# Patient Record
Sex: MALE | Race: WHITE | NOT HISPANIC OR LATINO | Employment: FULL TIME | URBAN - METROPOLITAN AREA
[De-identification: names, ages, dates, MRNs, and addresses within clinical notes are randomized per-mention and may not be internally consistent; named-entity substitution may affect disease eponyms.]

---

## 2023-05-23 LAB
6-ACETYLMORPHINE: <25 NG/ML
7-AMINOCLONAZEPAM: <25 NG/ML
ALPHA-HYDROXYALPRAZOLAM: <25 NG/ML
ALPHA-HYDROXYMIDAZOLAM: <25 NG/ML
ALPRAZOLAM: <25 NG/ML
AMPHETAMINE (PRESENCE) IN URINE BY SCREEN METHOD: ABNORMAL
BARBITURATES PRESENCE IN URINE BY SCREEN METHOD: ABNORMAL
CANNABINOIDS IN URINE BY SCREEN METHOD: ABNORMAL
CHLORDIAZEPOXIDE: <25 NG/ML
CLONAZEPAM: <25 NG/ML
COCAINE (PRESENCE) IN URINE BY SCREEN METHOD: ABNORMAL
CODEINE: <50 NG/ML
CREATINE, URINE FOR DRUG: 122.6 MG/DL
DIAZEPAM: <25 NG/ML
DRUG SCREEN COMMENT URINE: ABNORMAL
EDDP: <25 NG/ML
FENTANYL CONFIRMATION, URINE: <2.5 NG/ML
HYDROCODONE: <25 NG/ML
HYDROMORPHONE: <25 NG/ML
LORAZEPAM: <25 NG/ML
METHADONE CONFIRMATION,URINE: <25 NG/ML
MIDAZOLAM: <25 NG/ML
MORPHINE URINE: <50 NG/ML
NORDIAZEPAM: <25 NG/ML
NORFENTANYL: <2.5 NG/ML
NORHYDROCODONE: <25 NG/ML
NOROXYCODONE: 649 NG/ML
O-DESMETHYLTRAMADOL: <50 NG/ML
OXAZEPAM: <25 NG/ML
OXYCODONE: <25 NG/ML
OXYMORPHONE: 81 NG/ML
PHENCYCLIDINE (PRESENCE) IN URINE BY SCREEN METHOD: ABNORMAL
TEMAZEPAM: <25 NG/ML
TRAMADOL: <50 NG/ML
ZOLPIDEM METABOLITE (ZCA): <25 NG/ML
ZOLPIDEM: <25 NG/ML

## 2023-10-17 ENCOUNTER — TELEPHONE (OUTPATIENT)
Dept: RHEUMATOLOGY | Facility: CLINIC | Age: 63
End: 2023-10-17
Payer: COMMERCIAL

## 2023-10-17 DIAGNOSIS — Z79.899 MEDICATION MANAGEMENT: ICD-10-CM

## 2023-10-17 DIAGNOSIS — M15.9 GENERALIZED OSTEOARTHRITIS: Primary | ICD-10-CM

## 2023-10-17 PROBLEM — M54.32 BILATERAL SCIATICA: Status: ACTIVE | Noted: 2023-10-17

## 2023-10-17 PROBLEM — M17.0 PRIMARY OSTEOARTHRITIS OF BOTH KNEES: Status: ACTIVE | Noted: 2023-10-17

## 2023-10-17 PROBLEM — M19.012 LOCALIZED OSTEOARTHRITIS OF BOTH SHOULDER REGIONS: Status: ACTIVE | Noted: 2023-10-17

## 2023-10-17 PROBLEM — M54.31 BILATERAL SCIATICA: Status: ACTIVE | Noted: 2023-10-17

## 2023-10-17 PROBLEM — M70.62 GREATER TROCHANTERIC BURSITIS OF BOTH HIPS: Status: ACTIVE | Noted: 2023-10-17

## 2023-10-17 PROBLEM — M70.61 GREATER TROCHANTERIC BURSITIS OF BOTH HIPS: Status: ACTIVE | Noted: 2023-10-17

## 2023-10-17 PROBLEM — M19.011 LOCALIZED OSTEOARTHRITIS OF BOTH SHOULDER REGIONS: Status: ACTIVE | Noted: 2023-10-17

## 2023-10-17 PROBLEM — E78.2 MIXED HYPERLIPIDEMIA: Status: ACTIVE | Noted: 2023-10-17

## 2023-10-17 RX ORDER — NALOXONE HYDROCHLORIDE 4 MG/.1ML
4 SPRAY NASAL AS NEEDED
Qty: 1 EACH | Refills: 0 | Status: SHIPPED | OUTPATIENT
Start: 2023-10-17 | End: 2024-10-16

## 2023-10-17 RX ORDER — LISINOPRIL 20 MG/1
20 TABLET ORAL DAILY
COMMUNITY

## 2023-10-17 RX ORDER — INDOMETHACIN 50 MG/1
50 CAPSULE ORAL 3 TIMES DAILY PRN
COMMUNITY
End: 2024-04-11 | Stop reason: SDUPTHER

## 2023-10-17 RX ORDER — ESCITALOPRAM OXALATE 10 MG/1
10 TABLET ORAL DAILY
COMMUNITY

## 2023-10-17 RX ORDER — OXYCODONE AND ACETAMINOPHEN 7.5; 325 MG/1; MG/1
1 TABLET ORAL EVERY 6 HOURS PRN
Qty: 120 TABLET | Refills: 0 | Status: SHIPPED | OUTPATIENT
Start: 2023-10-21 | End: 2023-11-16 | Stop reason: SDUPTHER

## 2023-10-17 RX ORDER — AMITRIPTYLINE HYDROCHLORIDE 10 MG/1
20 TABLET, FILM COATED ORAL NIGHTLY
COMMUNITY

## 2023-10-17 RX ORDER — OXYCODONE AND ACETAMINOPHEN 7.5; 325 MG/1; MG/1
1 TABLET ORAL EVERY 6 HOURS PRN
COMMUNITY
End: 2023-10-17 | Stop reason: SDUPTHER

## 2023-10-17 RX ORDER — ATORVASTATIN CALCIUM 20 MG/1
20 TABLET, FILM COATED ORAL DAILY
COMMUNITY

## 2023-10-17 RX ORDER — METHOCARBAMOL 750 MG/1
750 TABLET, FILM COATED ORAL 4 TIMES DAILY
COMMUNITY
Start: 2020-11-02 | End: 2024-02-08 | Stop reason: WASHOUT

## 2023-10-17 NOTE — PROGRESS NOTES
Subjective   Patient ID: Gopal Herrera is a 63 y.o. male who presents for No chief complaint on file..  HPI    Review of Systems    Objective   Physical Exam    Assessment/Plan

## 2023-11-13 PROBLEM — Z79.899 MEDICATION MANAGEMENT: Status: ACTIVE | Noted: 2023-11-13

## 2023-11-13 NOTE — PROGRESS NOTES
Subjective   Patient ID: Gopal Herrera is a 63 y.o. male who presents for Osteoarthritis.  Reports worsening shoulder issues.  Can't raise arm to chest level.   Saw Ortho and considering shoulder replacements.    Desires injections today in shoulders and in hip bursa    Arthritis  Presents for follow-up visit. He complains of pain and stiffness. He reports no joint swelling. The symptoms have been worsening. Pertinent negatives include no diarrhea, fever or rash.     OARRS:    I have personally reviewed the OARRS report for Gopal Herrera. I have considered the risks of abuse, dependence, addiction and diversion and I believe that it is clinically appropriate for Gopal Herrera to be prescribed this medication    Is the patient prescribed a combination of a benzodiazepine and opioid?  No    Last Urine Drug Screen / ordered today: Yes  Recent Results (from the past 8760 hour(s))   OPIATE/OPIOID/BENZO PRESCRIPTION COMPLIANCE    Collection Time: 05/18/23 11:07 AM   Result Value Ref Range    DRUG SCREEN COMMENT URINE SEE BELOW     Creatine, Urine 122.6 mg/dL    Amphetamine Screen, Urine PRESUMPTIVE NEGATIVE NEGATIVE    Barbiturate Screen, Urine PRESUMPTIVE NEGATIVE NEGATIVE    Cannabinoid Screen, Urine PRESUMPTIVE NEGATIVE NEGATIVE    Cocaine Screen, Urine PRESUMPTIVE NEGATIVE NEGATIVE    PCP Screen, Urine PRESUMPTIVE NEGATIVE NEGATIVE    7-Aminoclonazepam <25 Cutoff <25 ng/mL    Alpha-Hydroxyalprazolam <25 Cutoff <25 ng/mL    Alpha-Hydroxymidazolam <25 Cutoff <25 ng/mL    Alprazolam <25 Cutoff <25 ng/mL    Chlordiazepoxide <25 Cutoff <25 ng/mL    Clonazepam <25 Cutoff <25 ng/mL    Diazepam <25 Cutoff <25 ng/mL    Lorazepam <25 Cutoff <25 ng/mL    Midazolam <25 Cutoff <25 ng/mL    Nordiazepam <25 Cutoff <25 ng/mL    Oxazepam <25 Cutoff <25 ng/mL    Temazepam <25 Cutoff <25 ng/mL    Zolpidem <25 Cutoff <25 ng/mL    Zolpidem Metabolite (ZCA) <25 Cutoff <25 ng/mL    6-Acetylmorphine <25 Cutoff <25 ng/mL    Codeine <50 Cutoff  <50 ng/mL    Hydrocodone <25 Cutoff <25 ng/mL    Hydromorphone <25 Cutoff <25 ng/mL    Morphine Urine <50 Cutoff <50 ng/mL    Norhydrocodone <25 Cutoff <25 ng/mL    Noroxycodone 649 (A) Cutoff <25 ng/mL    Oxycodone <25 Cutoff <25 ng/mL    Oxymorphone 81 (A) Cutoff <25 ng/mL    Tramadol <50 Cutoff <50 ng/mL    O-Desmethyltramadol <50 Cutoff <50 ng/mL    Fentanyl <2.5 Cutoff<2.5 ng/mL    Norfentanyl <2.5 Cutoff<2.5 ng/mL    METHADONE CONFIRMATION,URINE <25 Cutoff <25 ng/mL    EDDP <25 Cutoff <25 ng/mL     Results are as expected.         Controlled Substance Agreement:  Date of the Last Agreement: 11/16/23  Reviewed Controlled Substance Agreement including but not limited to the benefits, risks, and alternatives to treatment with a Controlled Substance medication(s).    Opioids:  What is the patient's goal of therapy? Pain relief  Is this being achieved with current treatment? yes    I have calculated the patient's Morphine Dose Equivalent (MED):   I have considered referral to Pain Management and/or a specialist, and do not feel it is necessary at this time.    I feel that it is clinically indicated to continue this current medication regimen after consideration of alternative therapies, and other non-opioid treatment.    Opioid Risk Screening:  Low risk    Pain Assessment:  Controlled substance questionnaire    On scale of 0-10  -pain on average over the last week?6  -pain at its worst over the last week? 10  %pain relieved by meds? 75  Amount of pain relief with meds make a difference? y  MD agrees with efficacy of med? y    Better/same/worse  Physical functioning better  Family relationships same  Social relationships same  Mood better  Sleep pattern better  Overall functioning better  Side effects?  Patient Active Problem List    Diagnosis Date Noted    Medication management 11/13/2023    Bilateral sciatica 10/17/2023    Generalized osteoarthritis 10/17/2023    Greater trochanteric bursitis of both hips  10/17/2023    Localized osteoarthritis of both shoulder regions 10/17/2023    Mixed hyperlipidemia 10/17/2023    Primary osteoarthritis of both knees 10/17/2023     Current Outpatient Medications   Medication Instructions    amitriptyline (ELAVIL) 20 mg, oral, Nightly    atorvastatin (LIPITOR) 20 mg, oral, Daily    diclofenac sodium 1 % kit APPLY 4 GRAMS TO AFFECTED AREA 4 TIMES DAILY. MAX OF 16 GRAMS TO ANY ONE AFFECTED JOINT    escitalopram (LEXAPRO) 10 mg, oral, Daily    indomethacin (INDOCIN) 50 mg, oral, 3 times daily PRN    lisinopril 20 mg, oral, Daily    methocarbamol (ROBAXIN-750) 750 mg, oral, 4 times daily    naloxone (NARCAN) 4 mg, nasal, As needed, May repeat every 2-3 minutes if needed, alternating nostrils, until medical assistance becomes available.    [START ON 11/20/2023] oxyCODONE-acetaminophen (Percocet) 7.5-325 mg tablet 1 tablet, oral, Every 6 hours PRN    predniSONE (Deltasone) 10 mg tablet Take 4 tablets (40 mg) by mouth once daily for 3 days, THEN 3 tablets (30 mg) once daily for 3 days, THEN 2 tablets (20 mg) once daily for 3 days, THEN 1 tablet (10 mg) once daily for 3 days.     Allergies   Allergen Reactions    Bacitracin Zinc-Polymyxin B Other    Benzalkonium Chloride Unknown    Neomycin-Bacitracin-Polymyxin Rash       Review of Systems   Constitutional:  Negative for fever and unexpected weight change.   HENT:  Negative for congestion, sore throat and tinnitus.         No dry eye and dry mouth   Respiratory:  Negative for cough and shortness of breath.    Cardiovascular:  Negative for leg swelling.   Gastrointestinal:  Negative for abdominal pain, constipation, diarrhea, nausea and vomiting.   Musculoskeletal:  Positive for arthralgias, arthritis, back pain, myalgias and stiffness. Negative for joint swelling.   Skin:  Negative for color change and rash.   Neurological:  Negative for dizziness, weakness, numbness and headaches.   Hematological:  Does not bruise/bleed easily.  "      Objective  /68   Pulse 77   Temp 37 °C (98.6 °F)   Ht 1.676 m (5' 6\")   Wt 67.8 kg (149 lb 8 oz)   BMI 24.13 kg/m²     Physical Exam  Vitals reviewed.   Constitutional:       Appearance: Normal appearance.   HENT:      Head: Normocephalic and atraumatic.   Eyes:      Conjunctiva/sclera: Conjunctivae normal.   Pulmonary:      Effort: Pulmonary effort is normal. No respiratory distress.   Musculoskeletal:         General: No swelling, tenderness or deformity.      Cervical back: Normal range of motion.      Right lower leg: No edema.      Left lower leg: No edema.      Comments: Limited ROM of shoulders  (R to 20degrees, L to 45 degrees).  Pain with movement of all extremities.   L shoulder with anterior capsule swelling.     Tender over ischial bursae B   Skin:     Findings: No bruising or rash.   Neurological:      General: No focal deficit present.      Mental Status: He is alert.   Psychiatric:         Mood and Affect: Mood normal.     Patient ID: Gopal Herrera is a 63 y.o. male.    Large Joint Injection/Arthrocentesis: bilateral glenohumeral on 11/16/2023 10:00 AM  Indications: pain  Details: 25 G needle, lateral approach  Medications (Right): 40 mg triamcinolone acetonide 40 mg/mL  Aspirate (Right): 0 mL  Medications (Left): 40 mg triamcinolone acetonide 40 mg/mL  Aspirate (Left): 0 mL  Outcome: tolerated well, no immediate complications  Procedure, treatment alternatives, risks and benefits explained, specific risks discussed. Consent was given by the patient. Immediately prior to procedure a time out was called to verify the correct patient, procedure, equipment, support staff and site/side marked as required. Patient was prepped and draped in the usual sterile fashion.       Large Joint Injection/Arthrocentesis: bilateral iliopsoas bursa on 11/16/2023 10:00 AM  Indications: pain  Details: medial approach  Medications (Right): 40 mg triamcinolone acetonide 40 mg/mL  Aspirate (Right): 0 " mL  Medications (Left): 40 mg triamcinolone acetonide 40 mg/mL  Aspirate (Left): 0 mL  Outcome: tolerated well, no immediate complications  Procedure, treatment alternatives, risks and benefits explained, specific risks discussed. Consent was given by the patient. Immediately prior to procedure a time out was called to verify the correct patient, procedure, equipment, support staff and site/side marked as required. Patient was prepped and draped in the usual sterile fashion.           Assessment/Plan   Problem List Items Addressed This Visit             ICD-10-CM    Generalized osteoarthritis - Primary M15.9     OA of multiple joints  Agree that he most likely needs shoulder replacement.  He will follow up with ortho.   Pt on opioids to help control pain.  Injections done for relief         Relevant Medications    predniSONE (Deltasone) 10 mg tablet    oxyCODONE-acetaminophen (Percocet) 7.5-325 mg tablet (Start on 11/20/2023)    Greater trochanteric bursitis of both hips M70.61, M70.62    Localized osteoarthritis of both shoulder regions M19.011, M19.012    Medication management Z79.899     On chronic opioid  Continue to monitor.  CSA 11/16/23          Follow up 3 mo   Patient was identified as a fall risk. Risk prevention instructions provided.

## 2023-11-16 ENCOUNTER — OFFICE VISIT (OUTPATIENT)
Dept: RHEUMATOLOGY | Facility: CLINIC | Age: 63
End: 2023-11-16
Payer: COMMERCIAL

## 2023-11-16 VITALS
TEMPERATURE: 98.6 F | BODY MASS INDEX: 24.03 KG/M2 | SYSTOLIC BLOOD PRESSURE: 120 MMHG | HEART RATE: 77 BPM | HEIGHT: 66 IN | WEIGHT: 149.5 LBS | DIASTOLIC BLOOD PRESSURE: 68 MMHG

## 2023-11-16 DIAGNOSIS — Z79.899 MEDICATION MANAGEMENT: ICD-10-CM

## 2023-11-16 DIAGNOSIS — M70.61 GREATER TROCHANTERIC BURSITIS OF BOTH HIPS: ICD-10-CM

## 2023-11-16 DIAGNOSIS — M70.62 GREATER TROCHANTERIC BURSITIS OF BOTH HIPS: ICD-10-CM

## 2023-11-16 DIAGNOSIS — M19.012 LOCALIZED OSTEOARTHRITIS OF BOTH SHOULDER REGIONS: ICD-10-CM

## 2023-11-16 DIAGNOSIS — M15.9 GENERALIZED OSTEOARTHRITIS: Primary | ICD-10-CM

## 2023-11-16 DIAGNOSIS — M19.011 LOCALIZED OSTEOARTHRITIS OF BOTH SHOULDER REGIONS: ICD-10-CM

## 2023-11-16 PROCEDURE — 20610 DRAIN/INJ JOINT/BURSA W/O US: CPT | Performed by: INTERNAL MEDICINE

## 2023-11-16 PROCEDURE — 1036F TOBACCO NON-USER: CPT | Performed by: INTERNAL MEDICINE

## 2023-11-16 PROCEDURE — 99213 OFFICE O/P EST LOW 20 MIN: CPT | Performed by: INTERNAL MEDICINE

## 2023-11-16 RX ORDER — PREDNISONE 10 MG/1
TABLET ORAL
Qty: 30 TABLET | Refills: 1 | Status: SHIPPED | OUTPATIENT
Start: 2023-11-16 | End: 2023-11-28

## 2023-11-16 RX ORDER — TRIAMCINOLONE ACETONIDE 40 MG/ML
40 INJECTION, SUSPENSION INTRA-ARTICULAR; INTRAMUSCULAR
Status: COMPLETED | OUTPATIENT
Start: 2023-11-16 | End: 2023-11-16

## 2023-11-16 RX ORDER — OXYCODONE AND ACETAMINOPHEN 7.5; 325 MG/1; MG/1
1 TABLET ORAL EVERY 6 HOURS PRN
Qty: 120 TABLET | Refills: 0 | Status: SHIPPED | OUTPATIENT
Start: 2023-11-20 | End: 2023-12-18 | Stop reason: SDUPTHER

## 2023-11-16 RX ADMIN — TRIAMCINOLONE ACETONIDE 40 MG: 40 INJECTION, SUSPENSION INTRA-ARTICULAR; INTRAMUSCULAR at 10:00

## 2023-11-16 ASSESSMENT — PATIENT HEALTH QUESTIONNAIRE - PHQ9
2. FEELING DOWN, DEPRESSED OR HOPELESS: NOT AT ALL
SUM OF ALL RESPONSES TO PHQ9 QUESTIONS 1 AND 2: 0
1. LITTLE INTEREST OR PLEASURE IN DOING THINGS: NOT AT ALL

## 2023-11-16 ASSESSMENT — ENCOUNTER SYMPTOMS
JOINT SWELLING: 0
COUGH: 0
ABDOMINAL PAIN: 0
VOMITING: 0
SORE THROAT: 0
UNEXPECTED WEIGHT CHANGE: 0
BACK PAIN: 1
HEADACHES: 0
ARTHRALGIAS: 1
STIFFNESS: 1
CONSTIPATION: 0
BRUISES/BLEEDS EASILY: 0
FEVER: 0
COLOR CHANGE: 0
WEAKNESS: 0
DIARRHEA: 0
MYALGIAS: 1
NUMBNESS: 0
NAUSEA: 0
SHORTNESS OF BREATH: 0
DIZZINESS: 0

## 2023-11-16 NOTE — ASSESSMENT & PLAN NOTE
OA of multiple joints  Agree that he most likely needs shoulder replacement.  He will follow up with ortho.   Pt on opioids to help control pain.  Injections done for relief

## 2023-11-16 NOTE — LETTER
November 16, 2023     Santo Lott MD  [id number]  [facility]  [facility] [address]    Patient: Gopal Herrera   YOB: 1960   Date of Visit: 11/16/2023       Dear Dr. Santo Lott MD:    Thank you for referring Gopal Herrera to me for evaluation. Below are my notes for this consultation.  If you have questions, please do not hesitate to call me. I look forward to following your patient along with you.       Sincerely,     Sheba Mtz MD      CC: No Recipients  ______________________________________________________________________________________    Subjective  Patient ID: Gopal Herrera is a 63 y.o. male who presents for Osteoarthritis.  Reports worsening shoulder issues.  Can't raise arm to chest level.   Saw Ortho and considering shoulder replacements.    Desires injections today in shoulders and in hip bursa    Arthritis  Presents for follow-up visit. He complains of pain and stiffness. He reports no joint swelling. The symptoms have been worsening. Pertinent negatives include no diarrhea, fever or rash.     OARRS:    I have personally reviewed the OARRS report for Gopal Herrera. I have considered the risks of abuse, dependence, addiction and diversion and I believe that it is clinically appropriate for Gopal Herrera to be prescribed this medication    Is the patient prescribed a combination of a benzodiazepine and opioid?  No    Last Urine Drug Screen / ordered today: Yes  Recent Results (from the past 8760 hour(s))   OPIATE/OPIOID/BENZO PRESCRIPTION COMPLIANCE    Collection Time: 05/18/23 11:07 AM   Result Value Ref Range    DRUG SCREEN COMMENT URINE SEE BELOW     Creatine, Urine 122.6 mg/dL    Amphetamine Screen, Urine PRESUMPTIVE NEGATIVE NEGATIVE    Barbiturate Screen, Urine PRESUMPTIVE NEGATIVE NEGATIVE    Cannabinoid Screen, Urine PRESUMPTIVE NEGATIVE NEGATIVE    Cocaine Screen, Urine PRESUMPTIVE NEGATIVE NEGATIVE    PCP Screen, Urine  PRESUMPTIVE NEGATIVE NEGATIVE    7-Aminoclonazepam <25 Cutoff <25 ng/mL    Alpha-Hydroxyalprazolam <25 Cutoff <25 ng/mL    Alpha-Hydroxymidazolam <25 Cutoff <25 ng/mL    Alprazolam <25 Cutoff <25 ng/mL    Chlordiazepoxide <25 Cutoff <25 ng/mL    Clonazepam <25 Cutoff <25 ng/mL    Diazepam <25 Cutoff <25 ng/mL    Lorazepam <25 Cutoff <25 ng/mL    Midazolam <25 Cutoff <25 ng/mL    Nordiazepam <25 Cutoff <25 ng/mL    Oxazepam <25 Cutoff <25 ng/mL    Temazepam <25 Cutoff <25 ng/mL    Zolpidem <25 Cutoff <25 ng/mL    Zolpidem Metabolite (ZCA) <25 Cutoff <25 ng/mL    6-Acetylmorphine <25 Cutoff <25 ng/mL    Codeine <50 Cutoff <50 ng/mL    Hydrocodone <25 Cutoff <25 ng/mL    Hydromorphone <25 Cutoff <25 ng/mL    Morphine Urine <50 Cutoff <50 ng/mL    Norhydrocodone <25 Cutoff <25 ng/mL    Noroxycodone 649 (A) Cutoff <25 ng/mL    Oxycodone <25 Cutoff <25 ng/mL    Oxymorphone 81 (A) Cutoff <25 ng/mL    Tramadol <50 Cutoff <50 ng/mL    O-Desmethyltramadol <50 Cutoff <50 ng/mL    Fentanyl <2.5 Cutoff<2.5 ng/mL    Norfentanyl <2.5 Cutoff<2.5 ng/mL    METHADONE CONFIRMATION,URINE <25 Cutoff <25 ng/mL    EDDP <25 Cutoff <25 ng/mL     Results are as expected.         Controlled Substance Agreement:  Date of the Last Agreement: 11/16/23  Reviewed Controlled Substance Agreement including but not limited to the benefits, risks, and alternatives to treatment with a Controlled Substance medication(s).    Opioids:  What is the patient's goal of therapy? Pain relief  Is this being achieved with current treatment? yes    I have calculated the patient's Morphine Dose Equivalent (MED):   I have considered referral to Pain Management and/or a specialist, and do not feel it is necessary at this time.    I feel that it is clinically indicated to continue this current medication regimen after consideration of alternative therapies, and other non-opioid treatment.    Opioid Risk Screening:  Low risk    Pain Assessment:  Controlled substance  questionnaire    On scale of 0-10  -pain on average over the last week?6  -pain at its worst over the last week? 10  %pain relieved by meds? 75  Amount of pain relief with meds make a difference? y  MD agrees with efficacy of med? y    Better/same/worse  Physical functioning better  Family relationships same  Social relationships same  Mood better  Sleep pattern better  Overall functioning better  Side effects?  Patient Active Problem List    Diagnosis Date Noted   • Medication management 11/13/2023   • Bilateral sciatica 10/17/2023   • Generalized osteoarthritis 10/17/2023   • Greater trochanteric bursitis of both hips 10/17/2023   • Localized osteoarthritis of both shoulder regions 10/17/2023   • Mixed hyperlipidemia 10/17/2023   • Primary osteoarthritis of both knees 10/17/2023     Current Outpatient Medications   Medication Instructions   • amitriptyline (ELAVIL) 20 mg, oral, Nightly   • atorvastatin (LIPITOR) 20 mg, oral, Daily   • diclofenac sodium 1 % kit APPLY 4 GRAMS TO AFFECTED AREA 4 TIMES DAILY. MAX OF 16 GRAMS TO ANY ONE AFFECTED JOINT   • escitalopram (LEXAPRO) 10 mg, oral, Daily   • indomethacin (INDOCIN) 50 mg, oral, 3 times daily PRN   • lisinopril 20 mg, oral, Daily   • methocarbamol (ROBAXIN-750) 750 mg, oral, 4 times daily   • naloxone (NARCAN) 4 mg, nasal, As needed, May repeat every 2-3 minutes if needed, alternating nostrils, until medical assistance becomes available.   • [START ON 11/20/2023] oxyCODONE-acetaminophen (Percocet) 7.5-325 mg tablet 1 tablet, oral, Every 6 hours PRN   • predniSONE (Deltasone) 10 mg tablet Take 4 tablets (40 mg) by mouth once daily for 3 days, THEN 3 tablets (30 mg) once daily for 3 days, THEN 2 tablets (20 mg) once daily for 3 days, THEN 1 tablet (10 mg) once daily for 3 days.     Allergies   Allergen Reactions   • Bacitracin Zinc-Polymyxin B Other   • Benzalkonium Chloride Unknown   • Neomycin-Bacitracin-Polymyxin Rash       Review of Systems   Constitutional:   "Negative for fever and unexpected weight change.   HENT:  Negative for congestion, sore throat and tinnitus.         No dry eye and dry mouth   Respiratory:  Negative for cough and shortness of breath.    Cardiovascular:  Negative for leg swelling.   Gastrointestinal:  Negative for abdominal pain, constipation, diarrhea, nausea and vomiting.   Musculoskeletal:  Positive for arthralgias, arthritis, back pain, myalgias and stiffness. Negative for joint swelling.   Skin:  Negative for color change and rash.   Neurological:  Negative for dizziness, weakness, numbness and headaches.   Hematological:  Does not bruise/bleed easily.       Objective /68   Pulse 77   Temp 37 °C (98.6 °F)   Ht 1.676 m (5' 6\")   Wt 67.8 kg (149 lb 8 oz)   BMI 24.13 kg/m²     Physical Exam  Vitals reviewed.   Constitutional:       Appearance: Normal appearance.   HENT:      Head: Normocephalic and atraumatic.   Eyes:      Conjunctiva/sclera: Conjunctivae normal.   Pulmonary:      Effort: Pulmonary effort is normal. No respiratory distress.   Musculoskeletal:         General: No swelling, tenderness or deformity.      Cervical back: Normal range of motion.      Right lower leg: No edema.      Left lower leg: No edema.      Comments: Limited ROM of shoulders  (R to 20degrees, L to 45 degrees).  Pain with movement of all extremities.   L shoulder with anterior capsule swelling.     Tender over ischial bursae B   Skin:     Findings: No bruising or rash.   Neurological:      General: No focal deficit present.      Mental Status: He is alert.   Psychiatric:         Mood and Affect: Mood normal.     Patient ID: Gopal Herrera is a 63 y.o. male.    Large Joint Injection/Arthrocentesis: bilateral glenohumeral on 11/16/2023 10:00 AM  Indications: pain  Details: 25 G needle, lateral approach  Medications (Right): 40 mg triamcinolone acetonide 40 mg/mL  Aspirate (Right): 0 mL  Medications (Left): 40 mg triamcinolone acetonide 40 mg/mL  Aspirate " (Left): 0 mL  Outcome: tolerated well, no immediate complications  Procedure, treatment alternatives, risks and benefits explained, specific risks discussed. Consent was given by the patient. Immediately prior to procedure a time out was called to verify the correct patient, procedure, equipment, support staff and site/side marked as required. Patient was prepped and draped in the usual sterile fashion.       Large Joint Injection/Arthrocentesis: bilateral iliopsoas bursa on 11/16/2023 10:00 AM  Indications: pain  Details: medial approach  Medications (Right): 40 mg triamcinolone acetonide 40 mg/mL  Aspirate (Right): 0 mL  Medications (Left): 40 mg triamcinolone acetonide 40 mg/mL  Aspirate (Left): 0 mL  Outcome: tolerated well, no immediate complications  Procedure, treatment alternatives, risks and benefits explained, specific risks discussed. Consent was given by the patient. Immediately prior to procedure a time out was called to verify the correct patient, procedure, equipment, support staff and site/side marked as required. Patient was prepped and draped in the usual sterile fashion.           Assessment/Plan  Problem List Items Addressed This Visit             ICD-10-CM    Generalized osteoarthritis - Primary M15.9     OA of multiple joints  Agree that he most likely needs shoulder replacement.  He will follow up with ortho.   Pt on opioids to help control pain.  Injections done for relief         Relevant Medications    predniSONE (Deltasone) 10 mg tablet    oxyCODONE-acetaminophen (Percocet) 7.5-325 mg tablet (Start on 11/20/2023)    Greater trochanteric bursitis of both hips M70.61, M70.62    Localized osteoarthritis of both shoulder regions M19.011, M19.012    Medication management Z79.899     On chronic opioid  Continue to monitor.  CSA 11/16/23          Follow up 3 mo   Patient was identified as a fall risk. Risk prevention instructions provided.

## 2023-11-16 NOTE — PATIENT INSTRUCTIONS
It was a pleasure to see you today  Please call if your symptoms worsen  Please review your summary for education and reminders.  Follow up at your next appointment.    If you had labs/xrays done today, you will be able to view on [employer].   We will contact you when the results are reviewed for further discussion.  Please note that you may receive your results before I have had a chance to review.  Please know I will be contacting you for discussion  Homegoing instructions for all patient  A healthy lifestyle helps chronic diseases  These are all the goals you should strive to improve your overall health   Blood pressure <130/85   BMI of <30 or waist circumference that is 1/2 of your height   Fasting blood sugar <107 (if you are diabetic, aim for an A1c <6.4%_   LDL cholesterol <130   Avoid smoking   Manage your stress   Get your preventive exams   Get your immunizations          Ways to Help Prevent Falls at Home    Quick Tips   ? Ask for help if you need it. Most people want to help!   ? Get up slowly after sitting or laying down   ? Wear a medical alert device or keep cell phone in your pocket   ? Use night lights, especially areas near a bathroom   ? Keep the items you use often within reach on a small stool or end table   ? Use an assistive device such as walker or cane, as directed by provider/physical therapy   ? Use a non-slip mat and grab bars in your bathroom. Look for home health sections for best options     Other Areas to Focus On   ? Exercise and nutrition: Regular exercise or taking a falls prevention class are great ways improve strength and balance. Don’t forget to stay hydrated and bring a snack!   ? Medicine side effects: Some medicines can make you sleepy or dizzy, which could cause a fall. Ask your healthcare provider about the side effects your medicines could cause. Be sure to let them know if you take any vitamins or supplements as well.   ? Tripping hazards: Remove items you could trip on,  such as loose mats, rugs, cords, and clutter. Wear closed toe shoes with rubber soles.   ? Health and wellness: Get regular checkups with your healthcare provider, plus routine vision and hearing screenings. Talk with your healthcare provider about:   o Your medicines and the possible side effects - bring them in a bag if that is easier!   o Problems with balance or feeling dizzy   o Ways to promote bone health, such as Vitamin D and calcium supplements   o Questions or concerns about falling     *Ask your healthcare team if you have questions     ©[facility], 2022

## 2023-12-18 ENCOUNTER — TELEPHONE (OUTPATIENT)
Dept: RHEUMATOLOGY | Facility: CLINIC | Age: 63
End: 2023-12-18
Payer: COMMERCIAL

## 2023-12-18 DIAGNOSIS — M15.9 GENERALIZED OSTEOARTHRITIS: ICD-10-CM

## 2023-12-18 RX ORDER — OXYCODONE AND ACETAMINOPHEN 7.5; 325 MG/1; MG/1
1 TABLET ORAL EVERY 6 HOURS PRN
Qty: 120 TABLET | Refills: 0 | Status: SHIPPED | OUTPATIENT
Start: 2023-12-21 | End: 2024-01-17 | Stop reason: SDUPTHER

## 2023-12-18 RX ORDER — OMEPRAZOLE 20 MG/1
20 CAPSULE, DELAYED RELEASE ORAL DAILY
COMMUNITY
Start: 2023-11-28 | End: 2024-05-16 | Stop reason: ALTCHOICE

## 2024-01-17 ENCOUNTER — TELEPHONE (OUTPATIENT)
Dept: RHEUMATOLOGY | Facility: CLINIC | Age: 64
End: 2024-01-17
Payer: COMMERCIAL

## 2024-01-17 DIAGNOSIS — M15.9 GENERALIZED OSTEOARTHRITIS: ICD-10-CM

## 2024-01-17 RX ORDER — CYCLOBENZAPRINE HCL 10 MG
10 TABLET ORAL 3 TIMES DAILY
COMMUNITY
Start: 2024-01-11

## 2024-01-17 RX ORDER — PREDNISONE 10 MG/1
TABLET ORAL
Qty: 30 TABLET | Refills: 0 | Status: SHIPPED
Start: 2024-01-17 | End: 2024-02-04 | Stop reason: ALTCHOICE

## 2024-01-17 RX ORDER — OXYCODONE AND ACETAMINOPHEN 7.5; 325 MG/1; MG/1
1 TABLET ORAL EVERY 6 HOURS PRN
Qty: 120 TABLET | Refills: 0 | Status: SHIPPED | OUTPATIENT
Start: 2024-01-20 | End: 2024-02-19 | Stop reason: SDUPTHER

## 2024-01-17 NOTE — TELEPHONE ENCOUNTER
Patient's wife called patient requesting rx refills-  Rx refill-oxycodone-acetaminophen 7.5-325 mg  Rx refill-Prednisone 10 mg  [facility] [phone]  [place] phone [phone]

## 2024-02-08 ENCOUNTER — OFFICE VISIT (OUTPATIENT)
Dept: RHEUMATOLOGY | Facility: CLINIC | Age: 64
End: 2024-02-08
Payer: COMMERCIAL

## 2024-02-08 VITALS
TEMPERATURE: 98 F | OXYGEN SATURATION: 98 % | HEART RATE: 83 BPM | DIASTOLIC BLOOD PRESSURE: 67 MMHG | WEIGHT: 149 LBS | HEIGHT: 65 IN | SYSTOLIC BLOOD PRESSURE: 113 MMHG | BODY MASS INDEX: 24.83 KG/M2

## 2024-02-08 DIAGNOSIS — M19.011 LOCALIZED OSTEOARTHRITIS OF BOTH SHOULDER REGIONS: ICD-10-CM

## 2024-02-08 DIAGNOSIS — M19.012 LOCALIZED OSTEOARTHRITIS OF BOTH SHOULDER REGIONS: ICD-10-CM

## 2024-02-08 DIAGNOSIS — S62.626A DISPLACED FRACTURE OF MIDDLE PHALANX OF RIGHT LITTLE FINGER, INITIAL ENCOUNTER FOR CLOSED FRACTURE: ICD-10-CM

## 2024-02-08 DIAGNOSIS — Z79.899 MEDICATION MANAGEMENT: ICD-10-CM

## 2024-02-08 DIAGNOSIS — M15.9 GENERALIZED OSTEOARTHRITIS: Primary | ICD-10-CM

## 2024-02-08 PROBLEM — T38.0X5A STEROID-INDUCED OSTEOPOROSIS: Status: RESOLVED | Noted: 2024-02-08 | Resolved: 2024-02-08

## 2024-02-08 PROBLEM — M81.8 STEROID-INDUCED OSTEOPOROSIS: Status: ACTIVE | Noted: 2024-02-08

## 2024-02-08 PROBLEM — M81.8 STEROID-INDUCED OSTEOPOROSIS: Status: RESOLVED | Noted: 2024-02-08 | Resolved: 2024-02-08

## 2024-02-08 PROBLEM — T38.0X5A STEROID-INDUCED OSTEOPOROSIS: Status: ACTIVE | Noted: 2024-02-08

## 2024-02-08 PROCEDURE — 1036F TOBACCO NON-USER: CPT | Performed by: INTERNAL MEDICINE

## 2024-02-08 PROCEDURE — 99214 OFFICE O/P EST MOD 30 MIN: CPT | Performed by: INTERNAL MEDICINE

## 2024-02-08 ASSESSMENT — ENCOUNTER SYMPTOMS
FATIGUE: 0
JOINT SWELLING: 0
FEVER: 0
COLOR CHANGE: 0
NUMBNESS: 0
ARTHRALGIAS: 1
SHORTNESS OF BREATH: 0
MYALGIAS: 1
STIFFNESS: 1
WEAKNESS: 0
BACK PAIN: 1
COUGH: 0

## 2024-02-08 ASSESSMENT — PATIENT HEALTH QUESTIONNAIRE - PHQ9
1. LITTLE INTEREST OR PLEASURE IN DOING THINGS: NOT AT ALL
SUM OF ALL RESPONSES TO PHQ9 QUESTIONS 1 & 2: 0
2. FEELING DOWN, DEPRESSED OR HOPELESS: NOT AT ALL

## 2024-02-08 NOTE — PROGRESS NOTES
Chief Complaint   Patient presents with    Follow-up    Osteoarthritis       SUBJECTIVE  Arthritis  Presents for follow-up visit. He complains of pain and stiffness. He reports no joint swelling. His pain is at a severity of 7/10. Pertinent negatives include no fatigue, fever or rash. (Since last seen, pt fell and sustained a fracture of his hand and nondisplaced fracture of his R shoulder.   Had surgery on hand and now, because his arthritis is so severe in his R shoulder, is scheduled for R shoulder replacement in March  Unable to get injections today for other joints that hurt because of planned surgery but pt states things are stable) Compliance with total regimen is %. Compliance with medications is %.   OARRS:  Reviewed mk 2/8/24  I have personally reviewed the OARRS report for Gopal Herrera. I have considered the risks of abuse, dependence, addiction and diversion and I believe that it is clinically appropriate for Gopal Herrera to be prescribed this medication    Is the patient prescribed a combination of a benzodiazepine and opioid?  No    Last Urine Drug Screen / ordered today: Yes  Recent Results (from the past 8760 hour(s))   OPIATE/OPIOID/BENZO PRESCRIPTION COMPLIANCE    Collection Time: 05/18/23 11:07 AM   Result Value Ref Range    DRUG SCREEN COMMENT URINE SEE BELOW     Creatine, Urine 122.6 mg/dL    Amphetamine Screen, Urine PRESUMPTIVE NEGATIVE NEGATIVE    Barbiturate Screen, Urine PRESUMPTIVE NEGATIVE NEGATIVE    Cannabinoid Screen, Urine PRESUMPTIVE NEGATIVE NEGATIVE    Cocaine Screen, Urine PRESUMPTIVE NEGATIVE NEGATIVE    PCP Screen, Urine PRESUMPTIVE NEGATIVE NEGATIVE    7-Aminoclonazepam <25 Cutoff <25 ng/mL    Alpha-Hydroxyalprazolam <25 Cutoff <25 ng/mL    Alpha-Hydroxymidazolam <25 Cutoff <25 ng/mL    Alprazolam <25 Cutoff <25 ng/mL    Chlordiazepoxide <25 Cutoff <25 ng/mL    Clonazepam <25 Cutoff <25 ng/mL    Diazepam <25 Cutoff <25 ng/mL    Lorazepam <25 Cutoff <25 ng/mL     Midazolam <25 Cutoff <25 ng/mL    Nordiazepam <25 Cutoff <25 ng/mL    Oxazepam <25 Cutoff <25 ng/mL    Temazepam <25 Cutoff <25 ng/mL    Zolpidem <25 Cutoff <25 ng/mL    Zolpidem Metabolite (ZCA) <25 Cutoff <25 ng/mL    6-Acetylmorphine <25 Cutoff <25 ng/mL    Codeine <50 Cutoff <50 ng/mL    Hydrocodone <25 Cutoff <25 ng/mL    Hydromorphone <25 Cutoff <25 ng/mL    Morphine Urine <50 Cutoff <50 ng/mL    Norhydrocodone <25 Cutoff <25 ng/mL    Noroxycodone 649 (A) Cutoff <25 ng/mL    Oxycodone <25 Cutoff <25 ng/mL    Oxymorphone 81 (A) Cutoff <25 ng/mL    Tramadol <50 Cutoff <50 ng/mL    O-Desmethyltramadol <50 Cutoff <50 ng/mL    Fentanyl <2.5 Cutoff<2.5 ng/mL    Norfentanyl <2.5 Cutoff<2.5 ng/mL    METHADONE CONFIRMATION,URINE <25 Cutoff <25 ng/mL    EDDP <25 Cutoff <25 ng/mL     Results are as expected.         Controlled Substance Agreement:  Date of the Last Agreement: 11/2023  Reviewed Controlled Substance Agreement including but not limited to the benefits, risks, and alternatives to treatment with a Controlled Substance medication(s).    Opioids:  What is the patient's goal of therapy? Pain relief  Is this being achieved with current treatment? yes    I have calculated the patient's Morphine Dose Equivalent (MED):   I have considered referral to Pain Management and/or a specialist, and do not feel it is necessary at this time.    I feel that it is clinically indicated to continue this current medication regimen after consideration of alternative therapies, and other non-opioid treatment.    Opioid Risk Screening:  No increased risk    Pain Assessment:  Controlled substance questionnaire    On scale of 0-10  -pain on average over the last week? 7  -pain at its worst over the last week? 10  %pain relieved by meds? 70%  Amount of pain relief with meds make a difference? y  MD agrees with efficacy of med? y    Better/same/worse  Physical functioning better  Family relationships better  Social relationships better    Mood better    Sleep pattern better  Overall functioning better   Side effects? no    Patient Active Problem List    Diagnosis Date Noted    Medication management 11/13/2023    Bilateral sciatica 10/17/2023    Generalized osteoarthritis 10/17/2023    Greater trochanteric bursitis of both hips 10/17/2023    Localized osteoarthritis of both shoulder regions 10/17/2023    Mixed hyperlipidemia 10/17/2023    Primary osteoarthritis of both knees 10/17/2023     Past Medical History:   Diagnosis Date    Caffeine use     Displaced fracture of middle phalanx of right little finger, initial encounter for closed fracture 01/12/2024    No pertinent past medical history     No significant past medical history     Current Outpatient Medications   Medication Instructions    amitriptyline (ELAVIL) 20 mg, oral, Nightly    atorvastatin (LIPITOR) 20 mg, oral, Daily    cyclobenzaprine (FLEXERIL) 10 mg, oral, 3 times daily    diclofenac sodium 1 % kit APPLY 4 GRAMS TO AFFECTED AREA 4 TIMES DAILY. MAX OF 16 GRAMS TO ANY ONE AFFECTED JOINT    escitalopram (LEXAPRO) 10 mg, oral, Daily    indomethacin (INDOCIN) 50 mg, oral, 3 times daily PRN    lisinopril 20 mg, oral, Daily    naloxone (NARCAN) 4 mg, nasal, As needed, May repeat every 2-3 minutes if needed, alternating nostrils, until medical assistance becomes available.    omeprazole (PRILOSEC) 20 mg, oral, Daily    oxyCODONE-acetaminophen (Percocet) 7.5-325 mg tablet 1 tablet, oral, Every 6 hours PRN     Allergies   Allergen Reactions    Bacitracin Zinc-Polymyxin B Other    Benzalkonium Chloride Unknown    Neomycin-Bacitracin-Polymyxin Rash     Review of Systems   Constitutional:  Negative for fatigue and fever.   Respiratory:  Negative for cough and shortness of breath.    Cardiovascular:  Negative for leg swelling.   Musculoskeletal:  Positive for arthralgias, arthritis, back pain, myalgias and stiffness. Negative for gait problem and joint swelling.   Skin:  Negative for color change  "and rash.   Neurological:  Negative for weakness and numbness.       PHYSICAL EXAM  /67 (BP Location: Left arm, Patient Position: Sitting, BP Cuff Size: Small adult)   Pulse 83   Temp 36.7 °C (98 °F) (Temporal)   Ht 1.651 m (5' 5\")   Wt 67.6 kg (149 lb)   SpO2 98%   BMI 24.79 kg/m²   Physical Exam  Vitals reviewed.   Constitutional:       General: He is not in acute distress.     Appearance: Normal appearance.   HENT:      Head: Normocephalic and atraumatic.   Eyes:      Conjunctiva/sclera: Conjunctivae normal.   Pulmonary:      Effort: Pulmonary effort is normal. No respiratory distress.   Musculoskeletal:         General: Tenderness present. No swelling or deformity. Normal range of motion.      Cervical back: Normal range of motion.      Right lower leg: No edema.      Left lower leg: No edema.      Comments: OA changes  S/p TKR  Limited ROM of R shoulder  Hand s/p surgery   Skin:     Findings: No bruising or rash.   Neurological:      General: No focal deficit present.      Mental Status: He is alert.      Gait: Gait normal.   Psychiatric:         Mood and Affect: Mood normal.         Assessment/plan  Problem List Items Addressed This Visit       Generalized osteoarthritis - Primary    Current Assessment & Plan     OA of multiple joints   Agree with plan for shoulder replacement.  Pt to continue meds.   Will continue to monitor         Localized osteoarthritis of both shoulder regions    Medication management    Overview     CSA 11/2023  UDS 5/2023         Current Assessment & Plan     Review of CSA done and  to continue to adhere to policy  CSA updated in EMR         RESOLVED: Displaced fracture of middle phalanx of right little finger, initial encounter for closed fracture     Follow up: ___3__months      Patient was identified as a fall risk. Risk prevention instructions provided.  "

## 2024-02-08 NOTE — ASSESSMENT & PLAN NOTE
OA of multiple joints   Agree with plan for shoulder replacement.  Pt to continue meds.   Will continue to monitor

## 2024-02-08 NOTE — PATIENT INSTRUCTIONS
It was a pleasure to see you today  Please call if your symptoms worsen  Please review your summary for education and reminders.  Follow up at your next appointment.    If you had labs/xrays done today, you will be able to view on [employer].   We will contact you when the results are reviewed for further discussion.  Please note that you may receive your results before I have had a chance to review.  Please know I will be contacting you for discussion  Homegoing instructions for all patient  A healthy lifestyle helps chronic diseases  These are all the goals you should strive to improve your overall health   Blood pressure <130/85   BMI of <30 or waist circumference that is 1/2 of your height   Fasting blood sugar <107 (if you are diabetic, aim for an A1c <6.4%_   LDL cholesterol <130   Avoid smoking   Manage your stress   Get your preventive exams   Get your immunizations          Ways to Help Prevent Falls at Home    Quick Tips   ? Ask for help if you need it. Most people want to help!   ? Get up slowly after sitting or laying down   ? Wear a medical alert device or keep cell phone in your pocket   ? Use night lights, especially areas near a bathroom   ? Keep the items you use often within reach on a small stool or end table   ? Use an assistive device such as walker or cane, as directed by provider/physical therapy   ? Use a non-slip mat and grab bars in your bathroom. Look for home health sections for best options     Other Areas to Focus On   ? Exercise and nutrition: Regular exercise or taking a falls prevention class are great ways improve strength and balance. Don’t forget to stay hydrated and bring a snack!   ? Medicine side effects: Some medicines can make you sleepy or dizzy, which could cause a fall. Ask your healthcare provider about the side effects your medicines could cause. Be sure to let them know if you take any vitamins or supplements as well.   ? Tripping hazards: Remove items you could trip on,  such as loose mats, rugs, cords, and clutter. Wear closed toe shoes with rubber soles.   ? Health and wellness: Get regular checkups with your healthcare provider, plus routine vision and hearing screenings. Talk with your healthcare provider about:   o Your medicines and the possible side effects - bring them in a bag if that is easier!   o Problems with balance or feeling dizzy   o Ways to promote bone health, such as Vitamin D and calcium supplements   o Questions or concerns about falling     *Ask your healthcare team if you have questions     ©[facility], 2022

## 2024-02-19 ENCOUNTER — TELEPHONE (OUTPATIENT)
Dept: RHEUMATOLOGY | Facility: CLINIC | Age: 64
End: 2024-02-19
Payer: COMMERCIAL

## 2024-02-19 DIAGNOSIS — M15.9 GENERALIZED OSTEOARTHRITIS: ICD-10-CM

## 2024-02-19 RX ORDER — OXYCODONE AND ACETAMINOPHEN 7.5; 325 MG/1; MG/1
1 TABLET ORAL EVERY 6 HOURS PRN
Qty: 120 TABLET | Refills: 0 | Status: SHIPPED | OUTPATIENT
Start: 2024-02-19 | End: 2024-03-15 | Stop reason: SDUPTHER

## 2024-02-21 ENCOUNTER — TELEPHONE (OUTPATIENT)
Dept: RHEUMATOLOGY | Facility: CLINIC | Age: 64
End: 2024-02-21
Payer: COMMERCIAL

## 2024-03-15 ENCOUNTER — TELEPHONE (OUTPATIENT)
Dept: PRIMARY CARE | Facility: CLINIC | Age: 64
End: 2024-03-15
Payer: COMMERCIAL

## 2024-03-15 DIAGNOSIS — M15.9 GENERALIZED OSTEOARTHRITIS: ICD-10-CM

## 2024-03-15 RX ORDER — OXYCODONE AND ACETAMINOPHEN 7.5; 325 MG/1; MG/1
1 TABLET ORAL EVERY 6 HOURS PRN
Qty: 120 TABLET | Refills: 0 | Status: SHIPPED | OUTPATIENT
Start: 2024-03-20 | End: 2024-04-17 | Stop reason: SDUPTHER

## 2024-03-15 RX ORDER — DICLOFENAC SODIUM 10 MG/G
4 GEL TOPICAL 4 TIMES DAILY PRN
Qty: 450 G | Refills: 3 | Status: SHIPPED | OUTPATIENT
Start: 2024-03-15 | End: 2025-03-15

## 2024-03-15 NOTE — TELEPHONE ENCOUNTER
Pt called for a refill on oxycodone-acetaminophen 7.5-325 mg and diclofenac sodium 1%       [facility] - [place], [place] - [address] [address]  [address]  [facility] [address]  Phone: [phone] Fax: [phone]

## 2024-04-11 ENCOUNTER — TELEPHONE (OUTPATIENT)
Dept: RHEUMATOLOGY | Facility: CLINIC | Age: 64
End: 2024-04-11
Payer: COMMERCIAL

## 2024-04-11 DIAGNOSIS — M15.9 GENERALIZED OSTEOARTHRITIS: Primary | ICD-10-CM

## 2024-04-11 RX ORDER — INDOMETHACIN 50 MG/1
50 CAPSULE ORAL 3 TIMES DAILY PRN
Qty: 90 CAPSULE | Refills: 11 | Status: SHIPPED | OUTPATIENT
Start: 2024-04-11 | End: 2025-04-11

## 2024-04-17 ENCOUNTER — TELEPHONE (OUTPATIENT)
Dept: PRIMARY CARE | Facility: CLINIC | Age: 64
End: 2024-04-17
Payer: COMMERCIAL

## 2024-04-17 RX ORDER — OXYCODONE AND ACETAMINOPHEN 7.5; 325 MG/1; MG/1
1 TABLET ORAL EVERY 6 HOURS PRN
Qty: 120 TABLET | Refills: 0 | Status: SHIPPED | OUTPATIENT
Start: 2024-04-19 | End: 2024-05-16 | Stop reason: SDUPTHER

## 2024-04-17 NOTE — TELEPHONE ENCOUNTER
Pt's wife called for refill on oxycodone-acetaminophen 7.5-325mg       [facility] - [place], [place] - [address] [address]  [address]  [facility] [address]  Phone: [phone] Fax: [phone]

## 2024-05-16 ENCOUNTER — OFFICE VISIT (OUTPATIENT)
Dept: RHEUMATOLOGY | Facility: CLINIC | Age: 64
End: 2024-05-16
Payer: COMMERCIAL

## 2024-05-16 ENCOUNTER — LAB (OUTPATIENT)
Dept: LAB | Facility: LAB | Age: 64
End: 2024-05-16
Payer: COMMERCIAL

## 2024-05-16 VITALS
TEMPERATURE: 99.5 F | HEART RATE: 61 BPM | HEIGHT: 64 IN | BODY MASS INDEX: 25.52 KG/M2 | WEIGHT: 149.5 LBS | OXYGEN SATURATION: 98 % | DIASTOLIC BLOOD PRESSURE: 72 MMHG | SYSTOLIC BLOOD PRESSURE: 122 MMHG

## 2024-05-16 DIAGNOSIS — Z79.899 MEDICATION MANAGEMENT: ICD-10-CM

## 2024-05-16 DIAGNOSIS — M19.012 LOCALIZED OSTEOARTHRITIS OF BOTH SHOULDER REGIONS: ICD-10-CM

## 2024-05-16 DIAGNOSIS — M70.61 GREATER TROCHANTERIC BURSITIS OF BOTH HIPS: ICD-10-CM

## 2024-05-16 DIAGNOSIS — M70.62 GREATER TROCHANTERIC BURSITIS OF BOTH HIPS: ICD-10-CM

## 2024-05-16 DIAGNOSIS — M19.011 LOCALIZED OSTEOARTHRITIS OF BOTH SHOULDER REGIONS: ICD-10-CM

## 2024-05-16 DIAGNOSIS — M15.9 GENERALIZED OSTEOARTHRITIS: Primary | ICD-10-CM

## 2024-05-16 LAB
AMPHETAMINES UR QL SCN: NORMAL
BARBITURATES UR QL SCN: NORMAL
BZE UR QL SCN: NORMAL
CANNABINOIDS UR QL SCN: NORMAL
CREAT UR-MCNC: 158.4 MG/DL (ref 20–370)
PCP UR QL SCN: NORMAL

## 2024-05-16 PROCEDURE — 82570 ASSAY OF URINE CREATININE: CPT

## 2024-05-16 PROCEDURE — 20610 DRAIN/INJ JOINT/BURSA W/O US: CPT | Performed by: INTERNAL MEDICINE

## 2024-05-16 PROCEDURE — 80346 BENZODIAZEPINES1-12: CPT

## 2024-05-16 PROCEDURE — 1036F TOBACCO NON-USER: CPT | Performed by: INTERNAL MEDICINE

## 2024-05-16 PROCEDURE — 80361 OPIATES 1 OR MORE: CPT

## 2024-05-16 PROCEDURE — 80365 DRUG SCREENING OXYCODONE: CPT

## 2024-05-16 PROCEDURE — 80368 SEDATIVE HYPNOTICS: CPT

## 2024-05-16 PROCEDURE — 80354 DRUG SCREENING FENTANYL: CPT

## 2024-05-16 PROCEDURE — 80358 DRUG SCREENING METHADONE: CPT

## 2024-05-16 PROCEDURE — 80307 DRUG TEST PRSMV CHEM ANLYZR: CPT

## 2024-05-16 PROCEDURE — 99213 OFFICE O/P EST LOW 20 MIN: CPT | Performed by: INTERNAL MEDICINE

## 2024-05-16 PROCEDURE — 80373 DRUG SCREENING TRAMADOL: CPT

## 2024-05-16 RX ORDER — TRIAMCINOLONE ACETONIDE 40 MG/ML
40 INJECTION, SUSPENSION INTRA-ARTICULAR; INTRAMUSCULAR
Status: COMPLETED | OUTPATIENT
Start: 2024-05-16 | End: 2024-05-16

## 2024-05-16 RX ORDER — OXYCODONE AND ACETAMINOPHEN 7.5; 325 MG/1; MG/1
1 TABLET ORAL EVERY 6 HOURS PRN
Qty: 120 TABLET | Refills: 0 | Status: SHIPPED | OUTPATIENT
Start: 2024-05-19 | End: 2025-05-19

## 2024-05-16 RX ORDER — FLUTICASONE PROPIONATE 50 UG/1
1 SPRAY, METERED NASAL DAILY
COMMUNITY
Start: 2024-04-29

## 2024-05-16 RX ADMIN — TRIAMCINOLONE ACETONIDE 40 MG: 40 INJECTION, SUSPENSION INTRA-ARTICULAR; INTRAMUSCULAR at 08:52

## 2024-05-16 ASSESSMENT — ENCOUNTER SYMPTOMS
NUMBNESS: 0
WEAKNESS: 0
SHORTNESS OF BREATH: 0
FEVER: 0
FATIGUE: 0
COUGH: 0
ARTHRALGIAS: 1
COLOR CHANGE: 0
JOINT SWELLING: 0
BACK PAIN: 1
MYALGIAS: 1

## 2024-05-16 ASSESSMENT — PATIENT HEALTH QUESTIONNAIRE - PHQ9
2. FEELING DOWN, DEPRESSED OR HOPELESS: NOT AT ALL
SUM OF ALL RESPONSES TO PHQ9 QUESTIONS 1 & 2: 0
1. LITTLE INTEREST OR PLEASURE IN DOING THINGS: NOT AT ALL

## 2024-05-16 NOTE — LETTER
May 16, 2024     Santo Lott MD  [id number]  [facility]  [place] [address]    Patient: Gopal Herrera   YOB: 1960   Date of Visit: 5/16/2024       Dear Dr. Santo Lott MD:    Thank you for referring Gopal Herrera to me for evaluation. Below are my notes for this visit  If you have questions, please do not hesitate to call me. I look forward to following your patient along with you.       Sincerely,     Sheba Mtz MD      CC: No Recipients  ______________________________________________________________________________________    RHEUMATOLOGY PROGRESS NOTE  Gopal Herrera 63 y.o. male  Chief Complaint   Patient presents with   • Follow-up   • Osteoarthritis       SUBJECTIVE  Since last seen, had R shoulder replacement.   Mobility is much better   Has some pain but not severe.  Desires injections of L shoulder and hips for relief of symptoms      OARRS:  Sheba Mtz MD on 5/16/2024  8:11 AM  I have personally reviewed the OARRS report for Gopal Herrera. I have considered the risks of abuse, dependence, addiction and diversion and I believe that it is clinically appropriate for Gopal Herrera to be prescribed this medication    Is the patient prescribed a combination of a benzodiazepine and opioid?  No    Last Urine Drug Screen / ordered today: Yes  Recent Results (from the past 8760 hour(s))   OPIATE/OPIOID/BENZO PRESCRIPTION COMPLIANCE    Collection Time: 05/18/23 11:07 AM   Result Value Ref Range    DRUG SCREEN COMMENT URINE SEE BELOW     Creatine, Urine 122.6 mg/dL    Amphetamine Screen, Urine PRESUMPTIVE NEGATIVE NEGATIVE    Barbiturate Screen, Urine PRESUMPTIVE NEGATIVE NEGATIVE    Cannabinoid Screen, Urine PRESUMPTIVE NEGATIVE NEGATIVE    Cocaine Screen, Urine PRESUMPTIVE NEGATIVE NEGATIVE    PCP Screen, Urine PRESUMPTIVE NEGATIVE NEGATIVE    7-Aminoclonazepam <25 Cutoff <25 ng/mL    Alpha-Hydroxyalprazolam <25 Cutoff <25 ng/mL     Alpha-Hydroxymidazolam <25 Cutoff <25 ng/mL    Alprazolam <25 Cutoff <25 ng/mL    Chlordiazepoxide <25 Cutoff <25 ng/mL    Clonazepam <25 Cutoff <25 ng/mL    Diazepam <25 Cutoff <25 ng/mL    Lorazepam <25 Cutoff <25 ng/mL    Midazolam <25 Cutoff <25 ng/mL    Nordiazepam <25 Cutoff <25 ng/mL    Oxazepam <25 Cutoff <25 ng/mL    Temazepam <25 Cutoff <25 ng/mL    Zolpidem <25 Cutoff <25 ng/mL    Zolpidem Metabolite (ZCA) <25 Cutoff <25 ng/mL    6-Acetylmorphine <25 Cutoff <25 ng/mL    Codeine <50 Cutoff <50 ng/mL    Hydrocodone <25 Cutoff <25 ng/mL    Hydromorphone <25 Cutoff <25 ng/mL    Morphine Urine <50 Cutoff <50 ng/mL    Norhydrocodone <25 Cutoff <25 ng/mL    Noroxycodone 649 (A) Cutoff <25 ng/mL    Oxycodone <25 Cutoff <25 ng/mL    Oxymorphone 81 (A) Cutoff <25 ng/mL    Tramadol <50 Cutoff <50 ng/mL    O-Desmethyltramadol <50 Cutoff <50 ng/mL    Fentanyl <2.5 Cutoff<2.5 ng/mL    Norfentanyl <2.5 Cutoff<2.5 ng/mL    METHADONE CONFIRMATION,URINE <25 Cutoff <25 ng/mL    EDDP <25 Cutoff <25 ng/mL     Results are as expected.         Controlled Substance Agreement:  Date of the Last Agreement: 11/23  Reviewed Controlled Substance Agreement including but not limited to the benefits, risks, and alternatives to treatment with a Controlled Substance medication(s).    Opioids:  What is the patient's goal of therapy? Pain relief   Is this being achieved with current treatment? yes    I have calculated the patient's Morphine Dose Equivalent (MED):   I have considered referral to Pain Management and/or a specialist, and do not feel it is necessary at this time.    I feel that it is clinically indicated to continue this current medication regimen after consideration of alternative therapies, and other non-opioid treatment.    Opioid Risk Screening:  No change in risk    Pain Assessment:  Controlled substance questionnaire    On scale of 0-10  -pain on average over the last week? 6  -pain at its worst over the last week?  10  %pain relieved by meds? 75  Amount of pain relief with meds make a difference? yes  MD agrees with efficacy of med? yes    Better/same/worse  Physical functioning better  Family relationships better  Social relationships better  Mood better    Sleep pattern better  Overall functioning better  Side effects? no    Records since last seen [facility] in [facility] and [facility] Record  Patient Active Problem List    Diagnosis Date Noted   • Medication management 11/13/2023   • Bilateral sciatica 10/17/2023   • Generalized osteoarthritis 10/17/2023   • Greater trochanteric bursitis of both hips 10/17/2023   • Localized osteoarthritis of both shoulder regions 10/17/2023   • Mixed hyperlipidemia 10/17/2023   • Primary osteoarthritis of both knees 10/17/2023     Past Medical History:   Diagnosis Date   • Caffeine use    • Displaced fracture of middle phalanx of right little finger, initial encounter for closed fracture 01/12/2024     Current Outpatient Medications   Medication Instructions   • amitriptyline (ELAVIL) 20 mg, oral, Nightly   • atorvastatin (LIPITOR) 20 mg, oral, Daily   • cyclobenzaprine (FLEXERIL) 10 mg, oral, 3 times daily   • diclofenac sodium (VOLTAREN) 4 g, Topical, 4 times daily PRN   • escitalopram (LEXAPRO) 10 mg, oral, Daily   • Flonase Allergy Relief 50 mcg/actuation nasal spray 1 spray, Each Nostril, Daily   • indomethacin (INDOCIN) 50 mg, oral, 3 times daily PRN   • lisinopril 20 mg, oral, Daily   • naloxone (NARCAN) 4 mg, nasal, As needed, May repeat every 2-3 minutes if needed, alternating nostrils, until medical assistance becomes available.   • [START ON 5/19/2024] oxyCODONE-acetaminophen (Percocet) 7.5-325 mg tablet 1 tablet, oral, Every 6 hours PRN     Allergies   Allergen Reactions   • Bacitracin Zinc-Polymyxin B Other   • Benzalkonium Chloride Unknown   • Neomycin-Bacitracin-Polymyxin Rash     Review of Systems   Constitutional:  Negative for fatigue and fever.  "  Respiratory:  Negative for cough and shortness of breath.    Cardiovascular:  Negative for leg swelling.   Musculoskeletal:  Positive for arthralgias, back pain and myalgias. Negative for gait problem and joint swelling.   Skin:  Negative for color change and rash.   Neurological:  Negative for weakness and numbness.       PHYSICAL EXAM  /72   Pulse 61   Temp 37.5 °C (99.5 °F) (Temporal)   Ht 1.626 m (5' 4\")   Wt 67.8 kg (149 lb 8 oz)   SpO2 98%   BMI 25.66 kg/m²   Physical Exam  Vitals reviewed.   Constitutional:       General: He is not in acute distress.     Appearance: Normal appearance.   HENT:      Head: Normocephalic and atraumatic.   Eyes:      Conjunctiva/sclera: Conjunctivae normal.   Pulmonary:      Effort: Pulmonary effort is normal. No respiratory distress.   Musculoskeletal:         General: Tenderness present. No swelling or deformity. Normal range of motion.      Cervical back: Normal range of motion.      Right lower leg: No edema.      Left lower leg: No edema.      Comments: OA changes  S/p TKR  Restored ROM of R shoulder  Hand s/p surgery   Skin:     Findings: No bruising or rash.   Neurological:      General: No focal deficit present.      Mental Status: He is alert.      Gait: Gait normal.   Psychiatric:         Mood and Affect: Mood normal.       Patient ID: Gopal Herrera is a 63 y.o. male.    Large Joint Injection/Arthrocentesis: bilateral greater trochanteric bursa on 5/16/2024 8:52 AM  Indications: pain  Details: 25 G needle, lateral approach  Medications (Right): 40 mg triamcinolone acetonide 40 mg/mL  Medications (Left): 40 mg triamcinolone acetonide 40 mg/mL  Outcome: tolerated well, no immediate complications  Procedure, treatment alternatives, risks and benefits explained, specific risks discussed. Consent was given by the patient. Immediately prior to procedure a time out was called to verify the correct patient, procedure, equipment, support staff and site/side marked as " required. Patient was prepped and draped in the usual sterile fashion.       Large Joint Injection/Arthrocentesis: L glenohumeral on 5/16/2024 8:52 AM  Indications: pain  Details: 25 G needle, anterior approach  Medications: 40 mg triamcinolone acetonide 40 mg/mL  Outcome: tolerated well, no immediate complications  Procedure, treatment alternatives, risks and benefits explained, specific risks discussed. Consent was given by the patient. Immediately prior to procedure a time out was called to verify the correct patient, procedure, equipment, support staff and site/side marked as required. Patient was prepped and draped in the usual sterile fashion.         Assessment/plan  Problem List Items Addressed This Visit       Generalized osteoarthritis - Primary    Current Assessment & Plan     Injections done for relief of symptoms   Uses opioid prn for pain relief  Continue to monitor         Relevant Medications    oxyCODONE-acetaminophen (Percocet) 7.5-325 mg tablet (Start on 5/19/2024)    Greater trochanteric bursitis of both hips    Localized osteoarthritis of both shoulder regions    Medication management    Overview     CSA 11/2023  UDS 5/2023; UDS 5/24         Relevant Orders    Opiate/Opioid/Benzo Prescription Compliance     Follow up: ___3__months        Patient was identified as a fall risk. Risk prevention instructions provided.

## 2024-05-16 NOTE — PATIENT INSTRUCTIONS
It was a pleasure to see you today  Please call if your symptoms worsen  Please review your summary for education and reminders.  Follow up at your next appointment.    If you had labs/xrays done today, you will be able to view on [employer].   We will contact you when the results are reviewed for further discussion.  Please note that you may receive your results before I have had a chance to review.  Please know I will be contacting you for discussion  Homegoing instructions for all patient  A healthy lifestyle helps chronic diseases  These are all the goals you should strive to improve your overall health   Blood pressure <130/85   BMI of <30 or waist circumference that is 1/2 of your height   Fasting blood sugar <107 (if you are diabetic, aim for an A1c <6.4%_   LDL cholesterol <130   Avoid smoking   Manage your stress   Get your preventive exams   Get your immunizations       Ways to Help Prevent Falls at Home    Quick Tips   ? Ask for help if you need it. Most people want to help!   ? Get up slowly after sitting or laying down   ? Wear a medical alert device or keep cell phone in your pocket   ? Use night lights, especially areas near a bathroom   ? Keep the items you use often within reach on a small stool or end table   ? Use an assistive device such as walker or cane, as directed by provider/physical therapy   ? Use a non-slip mat and grab bars in your bathroom. Look for home health sections for best options     Other Areas to Focus On   ? Exercise and nutrition: Regular exercise or taking a falls prevention class are great ways improve strength and balance. Don’t forget to stay hydrated and bring a snack!   ? Medicine side effects: Some medicines can make you sleepy or dizzy, which could cause a fall. Ask your healthcare provider about the side effects your medicines could cause. Be sure to let them know if you take any vitamins or supplements as well.   ? Tripping hazards: Remove items you could trip on, such  as loose mats, rugs, cords, and clutter. Wear closed toe shoes with rubber soles.   ? Health and wellness: Get regular checkups with your healthcare provider, plus routine vision and hearing screenings. Talk with your healthcare provider about:   o Your medicines and the possible side effects - bring them in a bag if that is easier!   o Problems with balance or feeling dizzy   o Ways to promote bone health, such as Vitamin D and calcium supplements   o Questions or concerns about falling     *Ask your healthcare team if you have questions     ©[facility], 2022

## 2024-05-16 NOTE — PROGRESS NOTES
RHEUMATOLOGY PROGRESS NOTE  Gopal Herrera 63 y.o. male  Chief Complaint   Patient presents with    Follow-up    Osteoarthritis       SUBJECTIVE  Since last seen, had R shoulder replacement.   Mobility is much better   Has some pain but not severe.  Desires injections of L shoulder and hips for relief of symptoms      OARRS:  Sheba Mtz MD on 5/16/2024  8:11 AM  I have personally reviewed the OARRS report for Gopal Herrera. I have considered the risks of abuse, dependence, addiction and diversion and I believe that it is clinically appropriate for Gopal Herrera to be prescribed this medication    Is the patient prescribed a combination of a benzodiazepine and opioid?  No    Last Urine Drug Screen / ordered today: Yes  Recent Results (from the past 8760 hour(s))   OPIATE/OPIOID/BENZO PRESCRIPTION COMPLIANCE    Collection Time: 05/18/23 11:07 AM   Result Value Ref Range    DRUG SCREEN COMMENT URINE SEE BELOW     Creatine, Urine 122.6 mg/dL    Amphetamine Screen, Urine PRESUMPTIVE NEGATIVE NEGATIVE    Barbiturate Screen, Urine PRESUMPTIVE NEGATIVE NEGATIVE    Cannabinoid Screen, Urine PRESUMPTIVE NEGATIVE NEGATIVE    Cocaine Screen, Urine PRESUMPTIVE NEGATIVE NEGATIVE    PCP Screen, Urine PRESUMPTIVE NEGATIVE NEGATIVE    7-Aminoclonazepam <25 Cutoff <25 ng/mL    Alpha-Hydroxyalprazolam <25 Cutoff <25 ng/mL    Alpha-Hydroxymidazolam <25 Cutoff <25 ng/mL    Alprazolam <25 Cutoff <25 ng/mL    Chlordiazepoxide <25 Cutoff <25 ng/mL    Clonazepam <25 Cutoff <25 ng/mL    Diazepam <25 Cutoff <25 ng/mL    Lorazepam <25 Cutoff <25 ng/mL    Midazolam <25 Cutoff <25 ng/mL    Nordiazepam <25 Cutoff <25 ng/mL    Oxazepam <25 Cutoff <25 ng/mL    Temazepam <25 Cutoff <25 ng/mL    Zolpidem <25 Cutoff <25 ng/mL    Zolpidem Metabolite (ZCA) <25 Cutoff <25 ng/mL    6-Acetylmorphine <25 Cutoff <25 ng/mL    Codeine <50 Cutoff <50 ng/mL    Hydrocodone <25 Cutoff <25 ng/mL    Hydromorphone <25 Cutoff <25 ng/mL    Morphine Urine <50 Cutoff  <50 ng/mL    Norhydrocodone <25 Cutoff <25 ng/mL    Noroxycodone 649 (A) Cutoff <25 ng/mL    Oxycodone <25 Cutoff <25 ng/mL    Oxymorphone 81 (A) Cutoff <25 ng/mL    Tramadol <50 Cutoff <50 ng/mL    O-Desmethyltramadol <50 Cutoff <50 ng/mL    Fentanyl <2.5 Cutoff<2.5 ng/mL    Norfentanyl <2.5 Cutoff<2.5 ng/mL    METHADONE CONFIRMATION,URINE <25 Cutoff <25 ng/mL    EDDP <25 Cutoff <25 ng/mL     Results are as expected.         Controlled Substance Agreement:  Date of the Last Agreement: 11/23  Reviewed Controlled Substance Agreement including but not limited to the benefits, risks, and alternatives to treatment with a Controlled Substance medication(s).    Opioids:  What is the patient's goal of therapy? Pain relief   Is this being achieved with current treatment? yes    I have calculated the patient's Morphine Dose Equivalent (MED):   I have considered referral to Pain Management and/or a specialist, and do not feel it is necessary at this time.    I feel that it is clinically indicated to continue this current medication regimen after consideration of alternative therapies, and other non-opioid treatment.    Opioid Risk Screening:  No change in risk    Pain Assessment:  Controlled substance questionnaire    On scale of 0-10  -pain on average over the last week? 6  -pain at its worst over the last week? 10  %pain relieved by meds? 75  Amount of pain relief with meds make a difference? yes  MD agrees with efficacy of med? yes    Better/same/worse  Physical functioning better  Family relationships better  Social relationships better  Mood better    Sleep pattern better  Overall functioning better  Side effects? no    Records since last seen reviewed in [facility], [facility] and [facility] Record  Patient Active Problem List    Diagnosis Date Noted    Medication management 11/13/2023    Bilateral sciatica 10/17/2023    Generalized osteoarthritis 10/17/2023    Greater trochanteric bursitis of both hips 10/17/2023     "Localized osteoarthritis of both shoulder regions 10/17/2023    Mixed hyperlipidemia 10/17/2023    Primary osteoarthritis of both knees 10/17/2023     Past Medical History:   Diagnosis Date    Caffeine use     Displaced fracture of middle phalanx of right little finger, initial encounter for closed fracture 01/12/2024     Current Outpatient Medications   Medication Instructions    amitriptyline (ELAVIL) 20 mg, oral, Nightly    atorvastatin (LIPITOR) 20 mg, oral, Daily    cyclobenzaprine (FLEXERIL) 10 mg, oral, 3 times daily    diclofenac sodium (VOLTAREN) 4 g, Topical, 4 times daily PRN    escitalopram (LEXAPRO) 10 mg, oral, Daily    Flonase Allergy Relief 50 mcg/actuation nasal spray 1 spray, Each Nostril, Daily    indomethacin (INDOCIN) 50 mg, oral, 3 times daily PRN    lisinopril 20 mg, oral, Daily    naloxone (NARCAN) 4 mg, nasal, As needed, May repeat every 2-3 minutes if needed, alternating nostrils, until medical assistance becomes available.    [START ON 5/19/2024] oxyCODONE-acetaminophen (Percocet) 7.5-325 mg tablet 1 tablet, oral, Every 6 hours PRN     Allergies   Allergen Reactions    Bacitracin Zinc-Polymyxin B Other    Benzalkonium Chloride Unknown    Neomycin-Bacitracin-Polymyxin Rash     Review of Systems   Constitutional:  Negative for fatigue and fever.   Respiratory:  Negative for cough and shortness of breath.    Cardiovascular:  Negative for leg swelling.   Musculoskeletal:  Positive for arthralgias, back pain and myalgias. Negative for gait problem and joint swelling.   Skin:  Negative for color change and rash.   Neurological:  Negative for weakness and numbness.       PHYSICAL EXAM  /72   Pulse 61   Temp 37.5 °C (99.5 °F) (Temporal)   Ht 1.626 m (5' 4\")   Wt 67.8 kg (149 lb 8 oz)   SpO2 98%   BMI 25.66 kg/m²   Physical Exam  Vitals reviewed.   Constitutional:       General: He is not in acute distress.     Appearance: Normal appearance.   HENT:      Head: Normocephalic and " atraumatic.   Eyes:      Conjunctiva/sclera: Conjunctivae normal.   Pulmonary:      Effort: Pulmonary effort is normal. No respiratory distress.   Musculoskeletal:         General: Tenderness present. No swelling or deformity. Normal range of motion.      Cervical back: Normal range of motion.      Right lower leg: No edema.      Left lower leg: No edema.      Comments: OA changes  S/p TKR  Restored ROM of R shoulder  Hand s/p surgery   Skin:     Findings: No bruising or rash.   Neurological:      General: No focal deficit present.      Mental Status: He is alert.      Gait: Gait normal.   Psychiatric:         Mood and Affect: Mood normal.       Patient ID: Gopal Herrera is a 63 y.o. male.    Large Joint Injection/Arthrocentesis: bilateral greater trochanteric bursa on 5/16/2024 8:52 AM  Indications: pain  Details: 25 G needle, lateral approach  Medications (Right): 40 mg triamcinolone acetonide 40 mg/mL  Medications (Left): 40 mg triamcinolone acetonide 40 mg/mL  Outcome: tolerated well, no immediate complications  Procedure, treatment alternatives, risks and benefits explained, specific risks discussed. Consent was given by the patient. Immediately prior to procedure a time out was called to verify the correct patient, procedure, equipment, support staff and site/side marked as required. Patient was prepped and draped in the usual sterile fashion.       Large Joint Injection/Arthrocentesis: L glenohumeral on 5/16/2024 8:52 AM  Indications: pain  Details: 25 G needle, anterior approach  Medications: 40 mg triamcinolone acetonide 40 mg/mL  Outcome: tolerated well, no immediate complications  Procedure, treatment alternatives, risks and benefits explained, specific risks discussed. Consent was given by the patient. Immediately prior to procedure a time out was called to verify the correct patient, procedure, equipment, support staff and site/side marked as required. Patient was prepped and draped in the usual sterile  fashion.         Assessment/plan  Problem List Items Addressed This Visit       Generalized osteoarthritis - Primary    Current Assessment & Plan     Injections done for relief of symptoms   Uses opioid prn for pain relief  Continue to monitor         Relevant Medications    oxyCODONE-acetaminophen (Percocet) 7.5-325 mg tablet (Start on 5/19/2024)    Greater trochanteric bursitis of both hips    Localized osteoarthritis of both shoulder regions    Medication management    Overview     CSA 11/2023  UDS 5/2023; UDS 5/24         Relevant Orders    Opiate/Opioid/Benzo Prescription Compliance     Follow up: ___3__months        Patient was identified as a fall risk. Risk prevention instructions provided.

## 2024-05-21 LAB
1OH-MIDAZOLAM UR CFM-MCNC: <25 NG/ML
6MAM UR CFM-MCNC: <25 NG/ML
7AMINOCLONAZEPAM UR CFM-MCNC: <25 NG/ML
A-OH ALPRAZ UR CFM-MCNC: <25 NG/ML
ALPRAZ UR CFM-MCNC: <25 NG/ML
CHLORDIAZEP UR CFM-MCNC: <25 NG/ML
CLONAZEPAM UR CFM-MCNC: <25 NG/ML
CODEINE UR CFM-MCNC: <50 NG/ML
DIAZEPAM UR CFM-MCNC: <25 NG/ML
EDDP UR CFM-MCNC: <25 NG/ML
FENTANYL UR CFM-MCNC: <2.5 NG/ML
HYDROCODONE CTO UR CFM-MCNC: <25 NG/ML
HYDROMORPHONE UR CFM-MCNC: <25 NG/ML
LORAZEPAM UR CFM-MCNC: <25 NG/ML
METHADONE UR CFM-MCNC: <25 NG/ML
MIDAZOLAM UR CFM-MCNC: <25 NG/ML
MORPHINE UR CFM-MCNC: <50 NG/ML
NORDIAZEPAM UR CFM-MCNC: <25 NG/ML
NORFENTANYL UR CFM-MCNC: <2.5 NG/ML
NORHYDROCODONE UR CFM-MCNC: <25 NG/ML
NOROXYCODONE UR CFM-MCNC: >1000 NG/ML
NORTRAMADOL UR-MCNC: <50 NG/ML
OXAZEPAM UR CFM-MCNC: <25 NG/ML
OXYCODONE UR CFM-MCNC: 757 NG/ML
OXYMORPHONE UR CFM-MCNC: 666 NG/ML
TEMAZEPAM UR CFM-MCNC: <25 NG/ML
TRAMADOL UR CFM-MCNC: <50 NG/ML
ZOLPIDEM UR CFM-MCNC: <25 NG/ML
ZOLPIDEM UR-MCNC: <25 NG/ML

## 2024-06-17 ENCOUNTER — TELEPHONE (OUTPATIENT)
Dept: PRIMARY CARE | Facility: CLINIC | Age: 64
End: 2024-06-17
Payer: COMMERCIAL

## 2024-06-17 DIAGNOSIS — M15.9 GENERALIZED OSTEOARTHRITIS: ICD-10-CM

## 2024-06-17 RX ORDER — OXYCODONE AND ACETAMINOPHEN 7.5; 325 MG/1; MG/1
1 TABLET ORAL EVERY 6 HOURS PRN
Qty: 120 TABLET | Refills: 0 | Status: SHIPPED | OUTPATIENT
Start: 2024-06-18 | End: 2025-06-18

## 2024-06-17 NOTE — TELEPHONE ENCOUNTER
Pt called for a refill on oxyCODONE-acetaminophen (Percocet) 7.5-325 mg tablet         [facility] - [place], [place] - [address] [address]  [address]  [facility] [address]  Phone: [phone] Fax: [phone]

## 2024-07-17 ENCOUNTER — TELEPHONE (OUTPATIENT)
Dept: PRIMARY CARE | Facility: CLINIC | Age: 64
End: 2024-07-17
Payer: COMMERCIAL

## 2024-07-17 DIAGNOSIS — M15.9 GENERALIZED OSTEOARTHRITIS: ICD-10-CM

## 2024-07-17 RX ORDER — OXYCODONE AND ACETAMINOPHEN 7.5; 325 MG/1; MG/1
1 TABLET ORAL EVERY 6 HOURS PRN
Qty: 120 TABLET | Refills: 0 | Status: SHIPPED | OUTPATIENT
Start: 2024-07-18 | End: 2025-07-18

## 2024-07-17 NOTE — TELEPHONE ENCOUNTER
Pt called for a refill on   oxyCODONE-acetaminophen (Percocet) 7.5-325 mg tablet         [facility] - [place], [place] - [address] [address]  [address]  [facility] [address]  Phone: [phone] Fax: [phone]

## 2024-08-08 ENCOUNTER — APPOINTMENT (OUTPATIENT)
Dept: RHEUMATOLOGY | Facility: CLINIC | Age: 64
End: 2024-08-08
Payer: COMMERCIAL

## 2024-08-08 VITALS
DIASTOLIC BLOOD PRESSURE: 69 MMHG | OXYGEN SATURATION: 98 % | BODY MASS INDEX: 25.4 KG/M2 | HEART RATE: 71 BPM | SYSTOLIC BLOOD PRESSURE: 114 MMHG | HEIGHT: 64 IN | WEIGHT: 148.8 LBS | TEMPERATURE: 97.7 F

## 2024-08-08 DIAGNOSIS — Z79.899 MEDICATION MANAGEMENT: ICD-10-CM

## 2024-08-08 DIAGNOSIS — M15.9 GENERALIZED OSTEOARTHRITIS: Primary | ICD-10-CM

## 2024-08-08 PROCEDURE — 1036F TOBACCO NON-USER: CPT | Performed by: INTERNAL MEDICINE

## 2024-08-08 PROCEDURE — 3008F BODY MASS INDEX DOCD: CPT | Performed by: INTERNAL MEDICINE

## 2024-08-08 PROCEDURE — 99213 OFFICE O/P EST LOW 20 MIN: CPT | Performed by: INTERNAL MEDICINE

## 2024-08-08 ASSESSMENT — ENCOUNTER SYMPTOMS
BACK PAIN: 0
JOINT SWELLING: 1
SHORTNESS OF BREATH: 0
ARTHRALGIAS: 1
NUMBNESS: 0
FATIGUE: 0
WEAKNESS: 0
COLOR CHANGE: 0
COUGH: 0
FEVER: 0
MYALGIAS: 0

## 2024-08-08 ASSESSMENT — PATIENT HEALTH QUESTIONNAIRE - PHQ9
2. FEELING DOWN, DEPRESSED OR HOPELESS: SEVERAL DAYS
SUM OF ALL RESPONSES TO PHQ9 QUESTIONS 1 AND 2: 2
1. LITTLE INTEREST OR PLEASURE IN DOING THINGS: SEVERAL DAYS
10. IF YOU CHECKED OFF ANY PROBLEMS, HOW DIFFICULT HAVE THESE PROBLEMS MADE IT FOR YOU TO DO YOUR WORK, TAKE CARE OF THINGS AT HOME, OR GET ALONG WITH OTHER PEOPLE: NOT DIFFICULT AT ALL

## 2024-08-08 NOTE — LETTER
August 8, 2024     Santo Lott MD  [address]  [facility]  [place] [address]    Patient: Gopal Herrera   YOB: 1960   Date of Visit: 8/8/2024       Dear Dr. Santo Lott MD:    Thank you for referring Gopal Herrera to me for evaluation. Below are my notes for this visit.  If you have questions, please do not hesitate to call me. I look forward to following your patient along with you.       Sincerely,     Sheba Mtz MD      CC: No Recipients  ______________________________________________________________________________________                                                    [facility] RHEUMATOLOGY     AND INTERNAL MEDICINE    RHEUMATOLOGY PROGRESS NOTE  Gopal Herrera 63 y.o. male  Chief Complaint   Patient presents with   • Osteoarthritis       SUBJECTIVE  Pt reports he is stable.  Now working at a new job and feels less stress.   Doing well since his shoulder replacement.  Overall not doing badly.  Has developed an occasional trigger finger and some arthritis in hands but nothing severe    OARRS:  Sheba Mtz MD on 8/8/2024  8:57 AM  I have personally reviewed the OARRS report for Gopal Herrera. I have considered the risks of abuse, dependence, addiction and diversion and I believe that it is clinically appropriate for Gopal Herrera to be prescribed this medication    Is the patient prescribed a combination of a benzodiazepine and opioid?  No    Last Urine Drug Screen / ordered today: Yes  Recent Results (from the past 8760 hour(s))   Confirmation Opiate/Opioid/Benzo Prescription Compliance    Collection Time: 05/16/24  8:55 AM   Result Value Ref Range    Clonazepam <25 <25 ng/mL    7-Aminoclonazepam <25 <25 ng/mL    Alprazolam <25 <25 ng/mL    Alpha-Hydroxyalprazolam <25 <25 ng/mL    Midazolam <25 <25 ng/mL    Alpha-Hydroxymidazolam <25 <25 ng/mL    Chlordiazepoxide <25 <25 ng/mL    Diazepam <25 <25 ng/mL    Nordiazepam <25  <25 ng/mL    Temazepam <25 <25 ng/mL    Oxazepam <25 <25 ng/mL    Lorazepam <25 <25 ng/mL    Methadone <25 <25 ng/mL    EDDP <25 <25 ng/mL    6-Acetylmorphine <25 <25 ng/mL    Codeine <50 <50 ng/mL    Hydrocodone <25 <25 ng/mL    Hydromorphone <25 <25 ng/mL    Morphine  <50 <50 ng/mL    Norhydrocodone <25 <25 ng/mL    Noroxycodone >1,000 (H) <25 ng/mL    Oxycodone 757 (H) <25 ng/mL    Oxymorphone 666 (H) <25 ng/mL    Fentanyl <2.5 <2.5 ng/mL    Norfentanyl <2.5 <2.5 ng/mL    Tramadol <50 <50 ng/mL    O-Desmethyltramadol <50 <50 ng/mL    Zolpidem <25 <25 ng/mL    Zolpidem Metabolite (ZCA) <25 <25 ng/mL   Screen Opiate/Opioid/Benzo Prescription Compliance    Collection Time: 05/16/24  8:55 AM   Result Value Ref Range    Creatinine, Urine Random 158.4 20.0 - 370.0 mg/dL    Amphetamine Screen, Urine Presumptive Negative Presumptive Negative    Barbiturate Screen, Urine Presumptive Negative Presumptive Negative    Cannabinoid Screen, Urine Presumptive Negative Presumptive Negative    Cocaine Metabolite Screen, Urine Presumptive Negative Presumptive Negative    PCP Screen, Urine Presumptive Negative Presumptive Negative     Results are as expected.         Controlled Substance Agreement:  Date of the Last Agreement: 11/23  Reviewed Controlled Substance Agreement including but not limited to the benefits, risks, and alternatives to treatment with a Controlled Substance medication(s).    Opioids:  What is the patient's goal of therapy? Pain relief  Is this being achieved with current treatment? yes    I have calculated the patient's Morphine Dose Equivalent (MED):   I have considered referral to Pain Management and/or a specialist, and do not feel it is necessary at this time.    I feel that it is clinically indicated to continue this current medication regimen after consideration of alternative therapies, and other non-opioid treatment.    Opioid Risk Screening:  No change in risk    Pain Assessment:  Controlled substance  questionnaire    On scale of 0-10  -pain on average over the last week? 6  -pain at its worst over the last week? 7  %pain relieved by meds? 80  Amount of pain relief with meds make a difference? yes  MD agrees with efficacy of med? yes    Better/same/worse  Physical functioning better  Family relationships better  Social relationships better  Mood better    Sleep pattern better  Overall functioning better  Side effects? no    Records since last seen reviewed in [facility], [facility] and Community Record  Patient Active Problem List    Diagnosis Date Noted   • Medication management 11/13/2023   • Bilateral sciatica 10/17/2023   • Generalized osteoarthritis 10/17/2023   • Greater trochanteric bursitis of both hips 10/17/2023   • Localized osteoarthritis of both shoulder regions 10/17/2023   • Mixed hyperlipidemia 10/17/2023   • Primary osteoarthritis of both knees 10/17/2023     Past Medical History:   Diagnosis Date   • Caffeine use    • Displaced fracture of middle phalanx of right little finger, initial encounter for closed fracture 01/12/2024     Current Outpatient Medications on File Prior to Visit   Medication Sig Dispense Refill   • amitriptyline (Elavil) 10 mg tablet Take 2 tablets (20 mg) by mouth once daily at bedtime.     • atorvastatin (Lipitor) 20 mg tablet Take 1 tablet (20 mg) by mouth once daily.     • cyclobenzaprine (Flexeril) 10 mg tablet Take 1 tablet (10 mg) by mouth 3 times a day.     • diclofenac sodium (Voltaren) 1 % gel Apply 4.5 inches (4 g) topically 4 times a day as needed (pain). 450 g 3   • escitalopram (Lexapro) 10 mg tablet Take 1 tablet (10 mg) by mouth once daily.     • Flonase Allergy Relief 50 mcg/actuation nasal spray Administer 1 spray into each nostril once daily.     • indomethacin (Indocin) 50 mg capsule Take 1 capsule (50 mg) by mouth 3 times a day as needed for moderate pain (4 - 6). 90 capsule 11   • lisinopril 20 mg tablet Take 1 tablet (20 mg) by mouth once daily.    "  • naloxone (Narcan) 4 mg/0.1 mL nasal spray Administer 1 spray (4 mg) into affected nostril(s) if needed for opioid reversal. May repeat every 2-3 minutes if needed, alternating nostrils, until medical assistance becomes available. 1 each 0   • oxyCODONE-acetaminophen (Percocet) 7.5-325 mg tablet Take 1 tablet by mouth every 6 hours if needed for severe pain (7 - 10). Do not fill before July 18, 2024. 120 tablet 0     No current facility-administered medications on file prior to visit.     Allergies   Allergen Reactions   • Bacitracin Zinc-Polymyxin B Other   • Benzalkonium Chloride Unknown   • Neomycin-Bacitracin-Polymyxin Rash     Social History     Tobacco Use   • Smoking status: Former     Current packs/day: 0.00     Types: Cigarettes     Quit date: 5/16/2009     Years since quitting: 15.2   • Smokeless tobacco: Never   Substance Use Topics   • Alcohol use: Not Currently   • Drug use: Yes     Types: Oxycodone     Review of Systems   Constitutional:  Negative for fatigue and fever.   Respiratory:  Negative for cough and shortness of breath.    Cardiovascular:  Negative for leg swelling.   Musculoskeletal:  Positive for arthralgias and joint swelling. Negative for back pain, gait problem and myalgias.   Skin:  Negative for color change and rash.   Neurological:  Negative for weakness and numbness.       PHYSICAL EXAM  /69   Pulse 71   Temp 36.5 °C (97.7 °F)   Ht 1.626 m (5' 4\")   Wt 67.5 kg (148 lb 12.8 oz)   SpO2 98%   BMI 25.54 kg/m²   Depression: Not at risk (8/8/2024)    PHQ-2    • PHQ-2 Score: 2     Physical Exam  Vitals reviewed.   Constitutional:       General: He is not in acute distress.     Appearance: Normal appearance.   HENT:      Head: Normocephalic and atraumatic.   Eyes:      Conjunctiva/sclera: Conjunctivae normal.   Pulmonary:      Effort: Pulmonary effort is normal. No respiratory distress.   Musculoskeletal:         General: Tenderness present. No swelling or deformity. Normal " range of motion.      Cervical back: Normal range of motion.      Right lower leg: No edema.      Left lower leg: No edema.      Comments: OA changes  S/p TKR, shoulder and Hand   Skin:     Findings: No bruising or rash.   Neurological:      General: No focal deficit present.      Mental Status: He is alert.      Gait: Gait normal.   Psychiatric:         Mood and Affect: Mood normal.       Health Maintenance Due   Topic Date Due   • Yearly Adult Physical  Never done   • HIV Screening  Never done   • Colorectal Cancer Screening  Never done   • Hepatitis C Screening  Never done   • Diabetes Screening  Never done   • Zoster Vaccines (1 of 2) Never done   • RSV Pregnant patients and/or  patients aged 60+ years (1 - 1-dose 60+ series) Never done   • COVID-19 Vaccine (3 - 2023-24 season) 09/01/2023   • Influenza Vaccine (1) 09/01/2024       Assessment/plan  Problem List Items Addressed This Visit       Generalized osteoarthritis - Primary    Current Assessment & Plan     Symptoms are stable.  Will continue to monitor.             Medication management    Overview     CSA 11/2023  UDS 5/2023; UDS 5/24         Current Assessment & Plan     Review of CSA done and  to continue to adhere to policy  CSA updated in EMR:                Follow up: ___3__months        Patient was identified as a fall risk. Risk prevention instructions provided.

## 2024-08-08 NOTE — PROGRESS NOTES
[facility] RHEUMATOLOGY     AND INTERNAL MEDICINE    RHEUMATOLOGY PROGRESS NOTE  Gopal Herrera 63 y.o. male  Chief Complaint   Patient presents with    Osteoarthritis       SUBJECTIVE  Pt reports he is stable.  Now working at a new job and feels less stress.   Doing well since his shoulder replacement.  Overall not doing badly.  Has developed an occasional trigger finger and some arthritis in hands but nothing severe    OARRS:  Sheba Mtz MD on 8/8/2024  8:57 AM  I have personally reviewed the OARRS report for Gopal Herrera. I have considered the risks of abuse, dependence, addiction and diversion and I believe that it is clinically appropriate for Gopal Herrera to be prescribed this medication    Is the patient prescribed a combination of a benzodiazepine and opioid?  No    Last Urine Drug Screen / ordered today: Yes  Recent Results (from the past 8760 hour(s))   Confirmation Opiate/Opioid/Benzo Prescription Compliance    Collection Time: 05/16/24  8:55 AM   Result Value Ref Range    Clonazepam <25 <25 ng/mL    7-Aminoclonazepam <25 <25 ng/mL    Alprazolam <25 <25 ng/mL    Alpha-Hydroxyalprazolam <25 <25 ng/mL    Midazolam <25 <25 ng/mL    Alpha-Hydroxymidazolam <25 <25 ng/mL    Chlordiazepoxide <25 <25 ng/mL    Diazepam <25 <25 ng/mL    Nordiazepam <25 <25 ng/mL    Temazepam <25 <25 ng/mL    Oxazepam <25 <25 ng/mL    Lorazepam <25 <25 ng/mL    Methadone <25 <25 ng/mL    EDDP <25 <25 ng/mL    6-Acetylmorphine <25 <25 ng/mL    Codeine <50 <50 ng/mL    Hydrocodone <25 <25 ng/mL    Hydromorphone <25 <25 ng/mL    Morphine  <50 <50 ng/mL    Norhydrocodone <25 <25 ng/mL    Noroxycodone >1,000 (H) <25 ng/mL    Oxycodone 757 (H) <25 ng/mL    Oxymorphone 666 (H) <25 ng/mL    Fentanyl <2.5 <2.5 ng/mL    Norfentanyl <2.5 <2.5 ng/mL    Tramadol <50 <50 ng/mL    O-Desmethyltramadol <50 <50 ng/mL    Zolpidem <25 <25 ng/mL    Zolpidem Metabolite (ZCA) <25 <25 ng/mL   Screen  Opiate/Opioid/Benzo Prescription Compliance    Collection Time: 05/16/24  8:55 AM   Result Value Ref Range    Creatinine, Urine Random 158.4 20.0 - 370.0 mg/dL    Amphetamine Screen, Urine Presumptive Negative Presumptive Negative    Barbiturate Screen, Urine Presumptive Negative Presumptive Negative    Cannabinoid Screen, Urine Presumptive Negative Presumptive Negative    Cocaine Metabolite Screen, Urine Presumptive Negative Presumptive Negative    PCP Screen, Urine Presumptive Negative Presumptive Negative     Results are as expected.         Controlled Substance Agreement:  Date of the Last Agreement: 11/23  Reviewed Controlled Substance Agreement including but not limited to the benefits, risks, and alternatives to treatment with a Controlled Substance medication(s).    Opioids:  What is the patient's goal of therapy? Pain relief  Is this being achieved with current treatment? yes    I have calculated the patient's Morphine Dose Equivalent (MED):   I have considered referral to Pain Management and/or a specialist, and do not feel it is necessary at this time.    I feel that it is clinically indicated to continue this current medication regimen after consideration of alternative therapies, and other non-opioid treatment.    Opioid Risk Screening:  No change in risk    Pain Assessment:  Controlled substance questionnaire    On scale of 0-10  -pain on average over the last week? 6  -pain at its worst over the last week? 7  %pain relieved by meds? 80  Amount of pain relief with meds make a difference? yes  MD agrees with efficacy of med? yes    Better/same/worse  Physical functioning better  Family relationships better  Social relationships better  Mood better    Sleep pattern better  Overall functioning better  Side effects? no    Records since last seen reviewed in [facility], [facility] and [facility] Record  Patient Active Problem List    Diagnosis Date Noted    Medication management 11/13/2023    Bilateral  sciatica 10/17/2023    Generalized osteoarthritis 10/17/2023    Greater trochanteric bursitis of both hips 10/17/2023    Localized osteoarthritis of both shoulder regions 10/17/2023    Mixed hyperlipidemia 10/17/2023    Primary osteoarthritis of both knees 10/17/2023     Past Medical History:   Diagnosis Date    Caffeine use     Displaced fracture of middle phalanx of right little finger, initial encounter for closed fracture 01/12/2024     Current Outpatient Medications on File Prior to Visit   Medication Sig Dispense Refill    amitriptyline (Elavil) 10 mg tablet Take 2 tablets (20 mg) by mouth once daily at bedtime.      atorvastatin (Lipitor) 20 mg tablet Take 1 tablet (20 mg) by mouth once daily.      cyclobenzaprine (Flexeril) 10 mg tablet Take 1 tablet (10 mg) by mouth 3 times a day.      diclofenac sodium (Voltaren) 1 % gel Apply 4.5 inches (4 g) topically 4 times a day as needed (pain). 450 g 3    escitalopram (Lexapro) 10 mg tablet Take 1 tablet (10 mg) by mouth once daily.      Flonase Allergy Relief 50 mcg/actuation nasal spray Administer 1 spray into each nostril once daily.      indomethacin (Indocin) 50 mg capsule Take 1 capsule (50 mg) by mouth 3 times a day as needed for moderate pain (4 - 6). 90 capsule 11    lisinopril 20 mg tablet Take 1 tablet (20 mg) by mouth once daily.      naloxone (Narcan) 4 mg/0.1 mL nasal spray Administer 1 spray (4 mg) into affected nostril(s) if needed for opioid reversal. May repeat every 2-3 minutes if needed, alternating nostrils, until medical assistance becomes available. 1 each 0    oxyCODONE-acetaminophen (Percocet) 7.5-325 mg tablet Take 1 tablet by mouth every 6 hours if needed for severe pain (7 - 10). Do not fill before July 18, 2024. 120 tablet 0     No current facility-administered medications on file prior to visit.     Allergies   Allergen Reactions    Bacitracin Zinc-Polymyxin B Other    Benzalkonium Chloride Unknown    Neomycin-Bacitracin-Polymyxin Rash  "    Social History     Tobacco Use    Smoking status: Former     Current packs/day: 0.00     Types: Cigarettes     Quit date: 5/16/2009     Years since quitting: 15.2    Smokeless tobacco: Never   Substance Use Topics    Alcohol use: Not Currently    Drug use: Yes     Types: Oxycodone     Review of Systems   Constitutional:  Negative for fatigue and fever.   Respiratory:  Negative for cough and shortness of breath.    Cardiovascular:  Negative for leg swelling.   Musculoskeletal:  Positive for arthralgias and joint swelling. Negative for back pain, gait problem and myalgias.   Skin:  Negative for color change and rash.   Neurological:  Negative for weakness and numbness.       PHYSICAL EXAM  /69   Pulse 71   Temp 36.5 °C (97.7 °F)   Ht 1.626 m (5' 4\")   Wt 67.5 kg (148 lb 12.8 oz)   SpO2 98%   BMI 25.54 kg/m²   Depression: Not at risk (8/8/2024)    PHQ-2     PHQ-2 Score: 2     Physical Exam  Vitals reviewed.   Constitutional:       General: He is not in acute distress.     Appearance: Normal appearance.   HENT:      Head: Normocephalic and atraumatic.   Eyes:      Conjunctiva/sclera: Conjunctivae normal.   Pulmonary:      Effort: Pulmonary effort is normal. No respiratory distress.   Musculoskeletal:         General: Tenderness present. No swelling or deformity. Normal range of motion.      Cervical back: Normal range of motion.      Right lower leg: No edema.      Left lower leg: No edema.      Comments: OA changes  S/p TKR, shoulder and Hand   Skin:     Findings: No bruising or rash.   Neurological:      General: No focal deficit present.      Mental Status: He is alert.      Gait: Gait normal.   Psychiatric:         Mood and Affect: Mood normal.       Health Maintenance Due   Topic Date Due    Yearly Adult Physical  Never done    HIV Screening  Never done    Colorectal Cancer Screening  Never done    Hepatitis C Screening  Never done    Diabetes Screening  Never done    Zoster Vaccines (1 of 2) Never " done    RSV Pregnant patients and/or  patients aged 60+ years (1 - 1-dose 60+ series) Never done    COVID-19 Vaccine (3 - 2023-24 season) 09/01/2023    Influenza Vaccine (1) 09/01/2024       Assessment/plan  Problem List Items Addressed This Visit       Generalized osteoarthritis - Primary    Current Assessment & Plan     Symptoms are stable.  Will continue to monitor.             Medication management    Overview     CSA 11/2023  UDS 5/2023; UDS 5/24         Current Assessment & Plan     Review of CSA done and  to continue to adhere to policy  CSA updated in EMR:                Follow up: ___3__months        Patient was identified as a fall risk. Risk prevention instructions provided.

## 2024-08-08 NOTE — PATIENT INSTRUCTIONS
It was a pleasure to see you today  Please call if your symptoms worsen  Please review your summary for education and reminders.  Follow up at your next appointment.    If you had labs/xrays done today, you will be able to view on [employer].   We will contact you when the results are reviewed for further discussion.  Please note that you may receive your results before I have had a chance to review.  Please know I will be contacting you for discussion  Homegoing instructions for all patient  A healthy lifestyle helps chronic diseases  These are all the goals you should strive to improve your overall health   Blood pressure <130/85   BMI of <30 or waist circumference that is 1/2 of your height   Fasting blood sugar <107 (if you are diabetic, aim for an A1c <6.4%_   LDL cholesterol <130   Avoid smoking   Manage your stress   Get your preventive exams   Get your immunizations       Ways to Help Prevent Falls at Home    Quick Tips   ? Ask for help if you need it. Most people want to help!   ? Get up slowly after sitting or laying down   ? Wear a medical alert device or keep cell phone in your pocket   ? Use night lights, especially areas near a bathroom   ? Keep the items you use often within reach on a small stool or end table   ? Use an assistive device such as walker or cane, as directed by provider/physical therapy   ? Use a non-slip mat and grab bars in your bathroom. Look for home health sections for best options     Other Areas to Focus On   ? Exercise and nutrition: Regular exercise or taking a falls prevention class are great ways improve strength and balance. Don’t forget to stay hydrated and bring a snack!   ? Medicine side effects: Some medicines can make you sleepy or dizzy, which could cause a fall. Ask your healthcare provider about the side effects your medicines could cause. Be sure to let them know if you take any vitamins or supplements as well.   ? Tripping hazards: Remove items you could trip on, such  as loose mats, rugs, cords, and clutter. Wear closed toe shoes with rubber soles.   ? Health and wellness: Get regular checkups with your healthcare provider, plus routine vision and hearing screenings. Talk with your healthcare provider about:   o Your medicines and the possible side effects - bring them in a bag if that is easier!   o Problems with balance or feeling dizzy   o Ways to promote bone health, such as Vitamin D and calcium supplements   o Questions or concerns about falling     *Ask your healthcare team if you have questions     ©[facility], 2022

## 2024-08-15 ENCOUNTER — TELEPHONE (OUTPATIENT)
Dept: PRIMARY CARE | Facility: CLINIC | Age: 64
End: 2024-08-15
Payer: COMMERCIAL

## 2024-08-15 DIAGNOSIS — M15.9 GENERALIZED OSTEOARTHRITIS: ICD-10-CM

## 2024-08-15 RX ORDER — OXYCODONE AND ACETAMINOPHEN 7.5; 325 MG/1; MG/1
1 TABLET ORAL EVERY 6 HOURS PRN
Qty: 120 TABLET | Refills: 0 | Status: SHIPPED | OUTPATIENT
Start: 2024-08-17 | End: 2025-08-17

## 2024-08-15 NOTE — TELEPHONE ENCOUNTER
Pt wife calling for refill of         oxyCODONE-acetaminophen (Percocet) 7.5-325 mg tablet        [facility] - [place], [place] - [address] [address]  [address]  [facility] [address]  Phone: [phone] Fax: [phone]

## 2024-09-16 ENCOUNTER — TELEPHONE (OUTPATIENT)
Dept: PRIMARY CARE | Facility: CLINIC | Age: 64
End: 2024-09-16
Payer: COMMERCIAL

## 2024-09-16 DIAGNOSIS — M15.9 GENERALIZED OSTEOARTHRITIS: ICD-10-CM

## 2024-09-16 RX ORDER — OXYCODONE AND ACETAMINOPHEN 7.5; 325 MG/1; MG/1
1 TABLET ORAL EVERY 6 HOURS PRN
Qty: 120 TABLET | Refills: 0 | Status: SHIPPED | OUTPATIENT
Start: 2024-09-16 | End: 2025-09-16

## 2024-09-16 NOTE — TELEPHONE ENCOUNTER
Pt needs a refill on   oxyCODONE-acetaminophen (Percocet) 7.5-325 mg tablet         [facility] - [place], [place] - [address] [address].  [address]  [facility] [address]  Phone: [phone] Fax: [phone]

## 2024-10-15 ENCOUNTER — TELEPHONE (OUTPATIENT)
Dept: PRIMARY CARE | Facility: CLINIC | Age: 64
End: 2024-10-15
Payer: COMMERCIAL

## 2024-10-15 DIAGNOSIS — M15.9 GENERALIZED OSTEOARTHRITIS: ICD-10-CM

## 2024-10-15 RX ORDER — OXYCODONE AND ACETAMINOPHEN 7.5; 325 MG/1; MG/1
1 TABLET ORAL EVERY 6 HOURS PRN
Qty: 120 TABLET | Refills: 0 | Status: SHIPPED | OUTPATIENT
Start: 2024-10-16 | End: 2025-10-16

## 2024-10-15 NOTE — TELEPHONE ENCOUNTER
Pt needs a refill on oxyCODONE-acetaminophen (Percocet) 7.5-325 mg tablet         [facility] - [place], [place] - [address] [address]  [address]  [facility] [address]  Phone: [phone] Fax: [phone]

## 2024-11-13 ENCOUNTER — APPOINTMENT (OUTPATIENT)
Dept: RHEUMATOLOGY | Facility: CLINIC | Age: 64
End: 2024-11-13
Payer: COMMERCIAL

## 2024-11-13 VITALS
BODY MASS INDEX: 25.68 KG/M2 | DIASTOLIC BLOOD PRESSURE: 64 MMHG | TEMPERATURE: 97.8 F | SYSTOLIC BLOOD PRESSURE: 115 MMHG | OXYGEN SATURATION: 96 % | WEIGHT: 150.4 LBS | HEIGHT: 64 IN | HEART RATE: 67 BPM

## 2024-11-13 DIAGNOSIS — M54.31 BILATERAL SCIATICA: ICD-10-CM

## 2024-11-13 DIAGNOSIS — M19.012 LOCALIZED OSTEOARTHRITIS OF BOTH SHOULDER REGIONS: ICD-10-CM

## 2024-11-13 DIAGNOSIS — M54.32 BILATERAL SCIATICA: ICD-10-CM

## 2024-11-13 DIAGNOSIS — M19.042 PRIMARY OSTEOARTHRITIS OF LEFT HAND: ICD-10-CM

## 2024-11-13 DIAGNOSIS — M15.9 GENERALIZED OSTEOARTHRITIS: ICD-10-CM

## 2024-11-13 DIAGNOSIS — M19.011 LOCALIZED OSTEOARTHRITIS OF BOTH SHOULDER REGIONS: ICD-10-CM

## 2024-11-13 DIAGNOSIS — Z79.899 MEDICATION MANAGEMENT: Primary | ICD-10-CM

## 2024-11-13 PROBLEM — E55.9 VITAMIN D DEFICIENCY: Status: ACTIVE | Noted: 2024-09-17

## 2024-11-13 PROBLEM — M17.0 PRIMARY OSTEOARTHRITIS OF BOTH KNEES: Status: RESOLVED | Noted: 2023-10-17 | Resolved: 2024-11-13

## 2024-11-13 PROBLEM — I10 HTN (HYPERTENSION): Status: ACTIVE | Noted: 2024-09-17

## 2024-11-13 PROCEDURE — 3074F SYST BP LT 130 MM HG: CPT | Performed by: INTERNAL MEDICINE

## 2024-11-13 PROCEDURE — 3008F BODY MASS INDEX DOCD: CPT | Performed by: INTERNAL MEDICINE

## 2024-11-13 PROCEDURE — 20600 DRAIN/INJ JOINT/BURSA W/O US: CPT | Performed by: INTERNAL MEDICINE

## 2024-11-13 PROCEDURE — 20551 NJX 1 TENDON ORIGIN/INSJ: CPT | Performed by: INTERNAL MEDICINE

## 2024-11-13 PROCEDURE — 1036F TOBACCO NON-USER: CPT | Performed by: INTERNAL MEDICINE

## 2024-11-13 PROCEDURE — 99214 OFFICE O/P EST MOD 30 MIN: CPT | Performed by: INTERNAL MEDICINE

## 2024-11-13 PROCEDURE — 3078F DIAST BP <80 MM HG: CPT | Performed by: INTERNAL MEDICINE

## 2024-11-13 PROCEDURE — 20610 DRAIN/INJ JOINT/BURSA W/O US: CPT | Performed by: INTERNAL MEDICINE

## 2024-11-13 RX ORDER — DICLOFENAC SODIUM 10 MG/G
4 GEL TOPICAL 4 TIMES DAILY PRN
Qty: 450 G | Refills: 3 | Status: SHIPPED | OUTPATIENT
Start: 2024-11-13 | End: 2025-11-13

## 2024-11-13 RX ORDER — TRIAMCINOLONE ACETONIDE 40 MG/ML
40 INJECTION, SUSPENSION INTRA-ARTICULAR; INTRAMUSCULAR
Status: COMPLETED | OUTPATIENT
Start: 2024-11-13 | End: 2024-11-13

## 2024-11-13 RX ORDER — DICLOFENAC SODIUM 75 MG/1
75 TABLET, DELAYED RELEASE ORAL 2 TIMES DAILY
Qty: 180 TABLET | Refills: 3 | Status: SHIPPED | OUTPATIENT
Start: 2024-11-13 | End: 2025-11-13

## 2024-11-13 RX ORDER — OXYCODONE AND ACETAMINOPHEN 7.5; 325 MG/1; MG/1
1 TABLET ORAL EVERY 6 HOURS PRN
Qty: 120 TABLET | Refills: 0 | Status: SHIPPED | OUTPATIENT
Start: 2024-11-15 | End: 2025-11-15

## 2024-11-13 RX ORDER — NALOXONE HYDROCHLORIDE 4 MG/.1ML
1 SPRAY NASAL AS NEEDED
Qty: 2 EACH | Refills: 0 | Status: SHIPPED | OUTPATIENT
Start: 2024-11-13

## 2024-11-13 RX ORDER — TRIAMCINOLONE ACETONIDE 40 MG/ML
10 INJECTION, SUSPENSION INTRA-ARTICULAR; INTRAMUSCULAR
Status: COMPLETED | OUTPATIENT
Start: 2024-11-13 | End: 2024-11-13

## 2024-11-13 ASSESSMENT — ENCOUNTER SYMPTOMS
NUMBNESS: 0
FEVER: 0
ARTHRALGIAS: 1
SHORTNESS OF BREATH: 0
WEAKNESS: 0
COLOR CHANGE: 0
BACK PAIN: 1
MYALGIAS: 1
COUGH: 0
FATIGUE: 0
JOINT SWELLING: 1

## 2024-11-13 NOTE — PROGRESS NOTES
[facility] RHEUMATOLOGY     AND INTERNAL MEDICINE    RHEUMATOLOGY PROGRESS NOTE  Gopal Herrera 64 y.o. male  Chief Complaint   Patient presents with    Follow-up    Osteoarthritis       SUBJECTIVE  Continues to have a lot of pain.  Having more pain in lower back and L shoulder and L hand.  Continues to work--current job is easier for him to manage.  Desires injections        OARRS:  Sheba Mtz MD on 11/13/2024  8:59 AM  I have personally reviewed the OARRS report for Gopal Herrera. I have considered the risks of abuse, dependence, addiction and diversion and I believe that it is clinically appropriate for Gopal Herrera to be prescribed this medication    Is the patient prescribed a combination of a benzodiazepine and opioid?  No    Last Urine Drug Screen / ordered today: Yes  Recent Results (from the past 8760 hours)   Confirmation Opiate/Opioid/Benzo Prescription Compliance    Collection Time: 05/16/24  8:55 AM   Result Value Ref Range    Clonazepam <25 <25 ng/mL    7-Aminoclonazepam <25 <25 ng/mL    Alprazolam <25 <25 ng/mL    Alpha-Hydroxyalprazolam <25 <25 ng/mL    Midazolam <25 <25 ng/mL    Alpha-Hydroxymidazolam <25 <25 ng/mL    Chlordiazepoxide <25 <25 ng/mL    Diazepam <25 <25 ng/mL    Nordiazepam <25 <25 ng/mL    Temazepam <25 <25 ng/mL    Oxazepam <25 <25 ng/mL    Lorazepam <25 <25 ng/mL    Methadone <25 <25 ng/mL    EDDP <25 <25 ng/mL    6-Acetylmorphine <25 <25 ng/mL    Codeine <50 <50 ng/mL    Hydrocodone <25 <25 ng/mL    Hydromorphone <25 <25 ng/mL    Morphine  <50 <50 ng/mL    Norhydrocodone <25 <25 ng/mL    Noroxycodone >1,000 (H) <25 ng/mL    Oxycodone 757 (H) <25 ng/mL    Oxymorphone 666 (H) <25 ng/mL    Fentanyl <2.5 <2.5 ng/mL    Norfentanyl <2.5 <2.5 ng/mL    Tramadol <50 <50 ng/mL    O-Desmethyltramadol <50 <50 ng/mL    Zolpidem <25 <25 ng/mL    Zolpidem Metabolite (ZCA) <25 <25 ng/mL   Screen Opiate/Opioid/Benzo Prescription Compliance     Collection Time: 05/16/24  8:55 AM   Result Value Ref Range    Creatinine, Urine Random 158.4 20.0 - 370.0 mg/dL    Amphetamine Screen, Urine Presumptive Negative Presumptive Negative    Barbiturate Screen, Urine Presumptive Negative Presumptive Negative    Cannabinoid Screen, Urine Presumptive Negative Presumptive Negative    Cocaine Metabolite Screen, Urine Presumptive Negative Presumptive Negative    PCP Screen, Urine Presumptive Negative Presumptive Negative     Results are as expected.         Controlled Substance Agreement:  Date of the Last Agreement: 11/23  Reviewed Controlled Substance Agreement including but not limited to the benefits, risks, and alternatives to treatment with a Controlled Substance medication(s).    Opioids:  What is the patient's goal of therapy? Pain relief  Is this being achieved with current treatment? yes    I have calculated the patient's Morphine Dose Equivalent (MED):   I have considered referral to Pain Management and/or a specialist, and do not feel it is necessary at this time.    I feel that it is clinically indicated to continue this current medication regimen after consideration of alternative therapies, and other non-opioid treatment.    Opioid Risk Screening:  No change in risk    Pain Assessment:  Controlled substance questionnaire    On scale of 0-10  -pain on average over the last week? 7  -pain at its worst over the last week? 10  %pain relieved by meds? 50  Amount of pain relief with meds make a difference? yes  MD agrees with efficacy of med? yes    Better/same/worse  Physical functioning better  Family relationships same  Social relationships same  Mood same  Sleep pattern same  Overall functioning same  Side effects? no    Records since last seen reviewed in [facility], [facility] and [facility] Record  Patient Active Problem List    Diagnosis Date Noted    Primary osteoarthritis of left hand 11/13/2024    HTN (hypertension) 09/17/2024    Vitamin D deficiency  09/17/2024    Medication management 11/13/2023    Bilateral sciatica 10/17/2023    Generalized osteoarthritis 10/17/2023    Greater trochanteric bursitis of both hips 10/17/2023    Localized osteoarthritis of both shoulder regions 10/17/2023    Mixed hyperlipidemia 10/17/2023     Past Medical History:   Diagnosis Date    Caffeine use     Displaced fracture of middle phalanx of right little finger, initial encounter for closed fracture 01/12/2024    Primary osteoarthritis of both knees 10/17/2023     Current Outpatient Medications on File Prior to Visit   Medication Sig Dispense Refill    amitriptyline (Elavil) 10 mg tablet Take 2 tablets (20 mg) by mouth once daily at bedtime.      atorvastatin (Lipitor) 20 mg tablet Take 1 tablet (20 mg) by mouth once daily.      cyclobenzaprine (Flexeril) 10 mg tablet Take 1 tablet (10 mg) by mouth 3 times a day.      escitalopram (Lexapro) 10 mg tablet Take 1 tablet (10 mg) by mouth once daily.      lisinopril 20 mg tablet Take 1 tablet (20 mg) by mouth once daily.      [DISCONTINUED] diclofenac sodium (Voltaren) 1 % gel Apply 4.5 inches (4 g) topically 4 times a day as needed (pain). 450 g 3    [DISCONTINUED] indomethacin (Indocin) 50 mg capsule Take 1 capsule (50 mg) by mouth 3 times a day as needed for moderate pain (4 - 6). 90 capsule 11    [DISCONTINUED] oxyCODONE-acetaminophen (Percocet) 7.5-325 mg tablet Take 1 tablet by mouth every 6 hours if needed for severe pain (7 - 10). Do not fill before October 16, 2024. 120 tablet 0    [DISCONTINUED] Flonase Allergy Relief 50 mcg/actuation nasal spray Administer 1 spray into each nostril once daily. (Patient not taking: Reported on 11/13/2024)       No current facility-administered medications on file prior to visit.     Allergies   Allergen Reactions    Bacitracin Zinc-Polymyxin B Other    Benzalkonium Chloride Unknown    Neomycin-Bacitracin-Polymyxin Rash     Social History     Tobacco Use    Smoking status: Former     Current  "packs/day: 0.00     Types: Cigarettes     Quit date: 5/16/2009     Years since quitting: 15.5    Smokeless tobacco: Never   Vaping Use    Vaping status: Never Used   Substance Use Topics    Alcohol use: Not Currently    Drug use: Yes     Types: Oxycodone     Review of Systems   Constitutional:  Negative for fatigue and fever.   Respiratory:  Negative for cough and shortness of breath.    Cardiovascular:  Negative for leg swelling.   Musculoskeletal:  Positive for arthralgias, back pain, joint swelling and myalgias. Negative for gait problem.   Skin:  Negative for color change and rash.   Neurological:  Negative for weakness and numbness.       PHYSICAL EXAM  /64   Pulse 67   Temp 36.6 °C (97.8 °F) (Temporal)   Ht 1.626 m (5' 4\")   Wt 68.2 kg (150 lb 6.4 oz)   SpO2 96%   BMI 25.82 kg/m²   Depression: Not at risk (8/8/2024)    PHQ-2     PHQ-2 Score: 2     Physical Exam  Vitals reviewed.   Constitutional:       General: He is not in acute distress.     Appearance: Normal appearance.   HENT:      Head: Normocephalic and atraumatic.   Eyes:      Conjunctiva/sclera: Conjunctivae normal.   Pulmonary:      Effort: Pulmonary effort is normal. No respiratory distress.   Musculoskeletal:         General: Swelling (2nd MCP L) and tenderness present. No deformity. Normal range of motion.      Cervical back: Normal range of motion.      Right lower leg: No edema.      Left lower leg: No edema.      Comments: OA changes  S/p TKR  Pain with movement   Skin:     Findings: No bruising or rash.   Neurological:      General: No focal deficit present.      Mental Status: He is alert.      Gait: Gait normal.   Psychiatric:         Mood and Affect: Mood normal.         Patient ID: Gopal Herrera is a 64 y.o. male.    Joint Injection Large/Arthrocentesis: L glenohumeral on 11/13/2024 9:46 AM  Indications: pain  Details: 25 G needle, anterior approach  Medications: 40 mg triamcinolone acetonide 40 mg/mL  Outcome: tolerated well, " no immediate complications  Procedure, treatment alternatives, risks and benefits explained, specific risks discussed. Consent was given by the patient. Immediately prior to procedure a time out was called to verify the correct patient, procedure, equipment, support staff and site/side marked as required. Patient was prepped and draped in the usual sterile fashion.       Joint Injection Small/Arthrocentesis: L index MCP on 11/13/2024 9:46 AM  Indications: pain  Details: 25 G needle, lateral approach  Medications: 10 mg triamcinolone acetonide 40 mg/mL  Outcome: tolerated well, no immediate complications  Procedure, treatment alternatives, risks and benefits explained, specific risks discussed. Consent was given by the patient. Immediately prior to procedure a time out was called to verify the correct patient, procedure, equipment, support staff and site/side marked as required. Patient was prepped and draped in the usual sterile fashion.       Injection tendon or ligament for (Trigger point over SI joint L) on 11/13/2024 9:47 AM  Indications: pain  Details: 25 G needle, lateral approach  Medications: 40 mg triamcinolone acetonide 40 mg/mL  Outcome: tolerated well, no immediate complications  Procedure, treatment alternatives, risks and benefits explained, specific risks discussed. Consent was given by the patient. Immediately prior to procedure a time out was called to verify the correct patient, procedure, equipment, support staff and site/side marked as required. Patient was prepped and draped in the usual sterile fashion.       Injection tendon or ligament for (Trigger point R SI joint) on 11/13/2024 9:49 AM  Indications: pain  Details: 25 G needle, lateral approach  Medications: 40 mg triamcinolone acetonide 40 mg/mL  Outcome: tolerated well, no immediate complications  Procedure, treatment alternatives, risks and benefits explained, specific risks discussed. Consent was given by the patient. Immediately prior  to procedure a time out was called to verify the correct patient, procedure, equipment, support staff and site/side marked as required. Patient was prepped and draped in the usual sterile fashion.       Health Maintenance Due   Topic Date Due    Yearly Adult Physical  Never done    HIV Screening  Never done    Colorectal Cancer Screening  Never done    Hepatitis C Screening  Never done    Diabetes Screening  Never done    Zoster Vaccines (1 of 2) Never done    Influenza Vaccine (1) 09/01/2024    COVID-19 Vaccine (3 - 2024-25 season) 09/01/2024       Assessment/plan  Assessment & Plan  Generalized osteoarthritis  Pt to continue meds.  Indocin not helping and will change NSAID.   Injections done for relief  Orders:    oxyCODONE-acetaminophen (Percocet) 7.5-325 mg tablet; Take 1 tablet by mouth every 6 hours if needed for severe pain (7 - 10). Do not fill before November 15, 2024.    diclofenac sodium (Voltaren) 1 % gel; Apply 4.5 inches (4 g) topically 4 times a day as needed (pain).    diclofenac (Voltaren) 75 mg EC tablet; Take 1 tablet (75 mg) by mouth 2 times a day. Do not crush, chew, or split.    Medication management  Review of CSA done and  to continue to adhere to policy  CSA updated in EMR:      Orders:    naloxone (Narcan) 4 mg/0.1 mL nasal spray; Administer 1 spray (4 mg) into affected nostril(s) if needed for opioid reversal. May repeat every 2-3 minutes if needed, alternating nostrils, until medical assistance becomes available.    Bilateral sciatica  Injections done       Localized osteoarthritis of both shoulder regions  Injection done       Primary osteoarthritis of left hand  Injection done         Follow up: __3___months    Immunization History   Administered Date(s) Administered    Flu vaccine (IIV4), preservative free *Check age/dose* 11/11/2014, 11/17/2022    Flu vaccine, trivalent, preservative free, HIGH-DOSE, age 65y+ (Fluzone) 11/02/2020    Flu vaccine, trivalent, preservative free, age 6  months and greater (Fluarix/Fluzone/Flulaval) 09/10/2015    Influenza, seasonal, injectable 11/11/2014, 11/11/2021    Pfizer Purple Cap SARS-CoV-2 04/03/2021, 04/24/2021    Tdap vaccine, age 7 year and older (BOOSTRIX, ADACEL) 11/11/2014, 04/05/2021           Patient was identified as a fall risk. Risk prevention instructions provided.

## 2024-11-13 NOTE — LETTER
November 13, 2024     Santo Lott MD  [id number]  [facility], [address]  [place] [address]    Patient: Gopal Herrera   YOB: 1960   Date of Visit: 11/13/2024       Dear Dr. Santo Lott MD:    Thank you for referring Gopal Herrera to me for evaluation. Below are my notes for this visit.  If you have questions, please do not hesitate to call me. I look forward to following your patient along with you.       Sincerely,     Sheba Mtz MD      CC: No Recipients  ______________________________________________________________________________________                                                    [facility] RHEUMATOLOGY     AND INTERNAL MEDICINE    RHEUMATOLOGY PROGRESS NOTE  Gopal Herrera 64 y.o. male  Chief Complaint   Patient presents with   • Follow-up   • Osteoarthritis       SUBJECTIVE  Continues to have a lot of pain.  Having more pain in lower back and L shoulder and L hand.  Continues to work--current job is easier for him to manage.  Desires injections        OARRS:  Sheba Mtz MD on 11/13/2024  8:59 AM  I have personally reviewed the OARRS report for Gopal Herrera. I have considered the risks of abuse, dependence, addiction and diversion and I believe that it is clinically appropriate for Gopal Herrera to be prescribed this medication    Is the patient prescribed a combination of a benzodiazepine and opioid?  No    Last Urine Drug Screen / ordered today: Yes  Recent Results (from the past 8760 hours)   Confirmation Opiate/Opioid/Benzo Prescription Compliance    Collection Time: 05/16/24  8:55 AM   Result Value Ref Range    Clonazepam <25 <25 ng/mL    7-Aminoclonazepam <25 <25 ng/mL    Alprazolam <25 <25 ng/mL    Alpha-Hydroxyalprazolam <25 <25 ng/mL    Midazolam <25 <25 ng/mL    Alpha-Hydroxymidazolam <25 <25 ng/mL    Chlordiazepoxide <25 <25 ng/mL    Diazepam <25 <25 ng/mL    Nordiazepam <25 <25 ng/mL    Temazepam <25 <25 ng/mL     Oxazepam <25 <25 ng/mL    Lorazepam <25 <25 ng/mL    Methadone <25 <25 ng/mL    EDDP <25 <25 ng/mL    6-Acetylmorphine <25 <25 ng/mL    Codeine <50 <50 ng/mL    Hydrocodone <25 <25 ng/mL    Hydromorphone <25 <25 ng/mL    Morphine  <50 <50 ng/mL    Norhydrocodone <25 <25 ng/mL    Noroxycodone >1,000 (H) <25 ng/mL    Oxycodone 757 (H) <25 ng/mL    Oxymorphone 666 (H) <25 ng/mL    Fentanyl <2.5 <2.5 ng/mL    Norfentanyl <2.5 <2.5 ng/mL    Tramadol <50 <50 ng/mL    O-Desmethyltramadol <50 <50 ng/mL    Zolpidem <25 <25 ng/mL    Zolpidem Metabolite (ZCA) <25 <25 ng/mL   Screen Opiate/Opioid/Benzo Prescription Compliance    Collection Time: 05/16/24  8:55 AM   Result Value Ref Range    Creatinine, Urine Random 158.4 20.0 - 370.0 mg/dL    Amphetamine Screen, Urine Presumptive Negative Presumptive Negative    Barbiturate Screen, Urine Presumptive Negative Presumptive Negative    Cannabinoid Screen, Urine Presumptive Negative Presumptive Negative    Cocaine Metabolite Screen, Urine Presumptive Negative Presumptive Negative    PCP Screen, Urine Presumptive Negative Presumptive Negative     Results are as expected.         Controlled Substance Agreement:  Date of the Last Agreement: 11/23  Reviewed Controlled Substance Agreement including but not limited to the benefits, risks, and alternatives to treatment with a Controlled Substance medication(s).    Opioids:  What is the patient's goal of therapy? Pain relief  Is this being achieved with current treatment? yes    I have calculated the patient's Morphine Dose Equivalent (MED):   I have considered referral to Pain Management and/or a specialist, and do not feel it is necessary at this time.    I feel that it is clinically indicated to continue this current medication regimen after consideration of alternative therapies, and other non-opioid treatment.    Opioid Risk Screening:  No change in risk    Pain Assessment:  Controlled substance questionnaire    On scale of 0-10  -pain  on average over the last week? 7  -pain at its worst over the last week? 10  %pain relieved by meds? 50  Amount of pain relief with meds make a difference? yes  MD agrees with efficacy of med? yes    Better/same/worse  Physical functioning better  Family relationships same  Social relationships same  Mood same  Sleep pattern same  Overall functioning same  Side effects? no    Records since last seen reviewed in [facility], [facility] and [facility]  Patient Active Problem List    Diagnosis Date Noted   • Primary osteoarthritis of left hand 11/13/2024   • HTN (hypertension) 09/17/2024   • Vitamin D deficiency 09/17/2024   • Medication management 11/13/2023   • Bilateral sciatica 10/17/2023   • Generalized osteoarthritis 10/17/2023   • Greater trochanteric bursitis of both hips 10/17/2023   • Localized osteoarthritis of both shoulder regions 10/17/2023   • Mixed hyperlipidemia 10/17/2023     Past Medical History:   Diagnosis Date   • Caffeine use    • Displaced fracture of middle phalanx of right little finger, initial encounter for closed fracture 01/12/2024   • Primary osteoarthritis of both knees 10/17/2023     Current Outpatient Medications on File Prior to Visit   Medication Sig Dispense Refill   • amitriptyline (Elavil) 10 mg tablet Take 2 tablets (20 mg) by mouth once daily at bedtime.     • atorvastatin (Lipitor) 20 mg tablet Take 1 tablet (20 mg) by mouth once daily.     • cyclobenzaprine (Flexeril) 10 mg tablet Take 1 tablet (10 mg) by mouth 3 times a day.     • escitalopram (Lexapro) 10 mg tablet Take 1 tablet (10 mg) by mouth once daily.     • lisinopril 20 mg tablet Take 1 tablet (20 mg) by mouth once daily.     • [DISCONTINUED] diclofenac sodium (Voltaren) 1 % gel Apply 4.5 inches (4 g) topically 4 times a day as needed (pain). 450 g 3   • [DISCONTINUED] indomethacin (Indocin) 50 mg capsule Take 1 capsule (50 mg) by mouth 3 times a day as needed for moderate pain (4 - 6). 90 capsule 11   •  "[DISCONTINUED] oxyCODONE-acetaminophen (Percocet) 7.5-325 mg tablet Take 1 tablet by mouth every 6 hours if needed for severe pain (7 - 10). Do not fill before October 16, 2024. 120 tablet 0   • [DISCONTINUED] Flonase Allergy Relief 50 mcg/actuation nasal spray Administer 1 spray into each nostril once daily. (Patient not taking: Reported on 11/13/2024)       No current facility-administered medications on file prior to visit.     Allergies   Allergen Reactions   • Bacitracin Zinc-Polymyxin B Other   • Benzalkonium Chloride Unknown   • Neomycin-Bacitracin-Polymyxin Rash     Social History     Tobacco Use   • Smoking status: Former     Current packs/day: 0.00     Types: Cigarettes     Quit date: 5/16/2009     Years since quitting: 15.5   • Smokeless tobacco: Never   Vaping Use   • Vaping status: Never Used   Substance Use Topics   • Alcohol use: Not Currently   • Drug use: Yes     Types: Oxycodone     Review of Systems   Constitutional:  Negative for fatigue and fever.   Respiratory:  Negative for cough and shortness of breath.    Cardiovascular:  Negative for leg swelling.   Musculoskeletal:  Positive for arthralgias, back pain, joint swelling and myalgias. Negative for gait problem.   Skin:  Negative for color change and rash.   Neurological:  Negative for weakness and numbness.       PHYSICAL EXAM  /64   Pulse 67   Temp 36.6 °C (97.8 °F) (Temporal)   Ht 1.626 m (5' 4\")   Wt 68.2 kg (150 lb 6.4 oz)   SpO2 96%   BMI 25.82 kg/m²   Depression: Not at risk (8/8/2024)    PHQ-2    • PHQ-2 Score: 2     Physical Exam  Vitals reviewed.   Constitutional:       General: He is not in acute distress.     Appearance: Normal appearance.   HENT:      Head: Normocephalic and atraumatic.   Eyes:      Conjunctiva/sclera: Conjunctivae normal.   Pulmonary:      Effort: Pulmonary effort is normal. No respiratory distress.   Musculoskeletal:         General: Swelling (2nd MCP L) and tenderness present. No deformity. Normal " range of motion.      Cervical back: Normal range of motion.      Right lower leg: No edema.      Left lower leg: No edema.      Comments: OA changes  S/p TKR  Pain with movement   Skin:     Findings: No bruising or rash.   Neurological:      General: No focal deficit present.      Mental Status: He is alert.      Gait: Gait normal.   Psychiatric:         Mood and Affect: Mood normal.         Patient ID: Gopal Herrera is a 64 y.o. male.    Joint Injection Large/Arthrocentesis: L glenohumeral on 11/13/2024 9:46 AM  Indications: pain  Details: 25 G needle, anterior approach  Medications: 40 mg triamcinolone acetonide 40 mg/mL  Outcome: tolerated well, no immediate complications  Procedure, treatment alternatives, risks and benefits explained, specific risks discussed. Consent was given by the patient. Immediately prior to procedure a time out was called to verify the correct patient, procedure, equipment, support staff and site/side marked as required. Patient was prepped and draped in the usual sterile fashion.       Joint Injection Small/Arthrocentesis: L index MCP on 11/13/2024 9:46 AM  Indications: pain  Details: 25 G needle, lateral approach  Medications: 10 mg triamcinolone acetonide 40 mg/mL  Outcome: tolerated well, no immediate complications  Procedure, treatment alternatives, risks and benefits explained, specific risks discussed. Consent was given by the patient. Immediately prior to procedure a time out was called to verify the correct patient, procedure, equipment, support staff and site/side marked as required. Patient was prepped and draped in the usual sterile fashion.       Injection tendon or ligament for (Trigger point over SI joint L) on 11/13/2024 9:47 AM  Indications: pain  Details: 25 G needle, lateral approach  Medications: 40 mg triamcinolone acetonide 40 mg/mL  Outcome: tolerated well, no immediate complications  Procedure, treatment alternatives, risks and benefits explained, specific risks  discussed. Consent was given by the patient. Immediately prior to procedure a time out was called to verify the correct patient, procedure, equipment, support staff and site/side marked as required. Patient was prepped and draped in the usual sterile fashion.       Injection tendon or ligament for (Trigger point R SI joint) on 11/13/2024 9:49 AM  Indications: pain  Details: 25 G needle, lateral approach  Medications: 40 mg triamcinolone acetonide 40 mg/mL  Outcome: tolerated well, no immediate complications  Procedure, treatment alternatives, risks and benefits explained, specific risks discussed. Consent was given by the patient. Immediately prior to procedure a time out was called to verify the correct patient, procedure, equipment, support staff and site/side marked as required. Patient was prepped and draped in the usual sterile fashion.       Health Maintenance Due   Topic Date Due   • Yearly Adult Physical  Never done   • HIV Screening  Never done   • Colorectal Cancer Screening  Never done   • Hepatitis C Screening  Never done   • Diabetes Screening  Never done   • Zoster Vaccines (1 of 2) Never done   • Influenza Vaccine (1) 09/01/2024   • COVID-19 Vaccine (3 - 2024-25 season) 09/01/2024       Assessment/plan  Assessment & Plan  Generalized osteoarthritis  Pt to continue meds.  Indocin not helping and will change NSAID.   Injections done for relief  Orders:  •  oxyCODONE-acetaminophen (Percocet) 7.5-325 mg tablet; Take 1 tablet by mouth every 6 hours if needed for severe pain (7 - 10). Do not fill before November 15, 2024.  •  diclofenac sodium (Voltaren) 1 % gel; Apply 4.5 inches (4 g) topically 4 times a day as needed (pain).  •  diclofenac (Voltaren) 75 mg EC tablet; Take 1 tablet (75 mg) by mouth 2 times a day. Do not crush, chew, or split.    Medication management  Review of CSA done and  to continue to adhere to policy  CSA updated in EMR:      Orders:  •  naloxone (Narcan) 4 mg/0.1 mL nasal spray;  Administer 1 spray (4 mg) into affected nostril(s) if needed for opioid reversal. May repeat every 2-3 minutes if needed, alternating nostrils, until medical assistance becomes available.    Bilateral sciatica  Injections done       Localized osteoarthritis of both shoulder regions  Injection done       Primary osteoarthritis of left hand  Injection done         Follow up: __3___months    Immunization History   Administered Date(s) Administered   • Flu vaccine (IIV4), preservative free *Check age/dose* 11/11/2014, 11/17/2022   • Flu vaccine, trivalent, preservative free, HIGH-DOSE, age 65y+ (Fluzone) 11/02/2020   • Flu vaccine, trivalent, preservative free, age 6 months and greater (Fluarix/Fluzone/Flulaval) 09/10/2015   • Influenza, seasonal, injectable 11/11/2014, 11/11/2021   • Pfizer Purple Cap SARS-CoV-2 04/03/2021, 04/24/2021   • Tdap vaccine, age 7 year and older (BOOSTRIX, ADACEL) 11/11/2014, 04/05/2021           Patient was identified as a fall risk. Risk prevention instructions provided.

## 2024-11-13 NOTE — PATIENT INSTRUCTIONS
It was a pleasure to see you today  Please call if your symptoms worsen  Please review your summary for education and reminders.  Follow up at your next appointment.    If you had labs/xrays done today, you will be able to view on [employer].   We will contact you when the results are reviewed for further discussion.  Please note that you may receive your results before I have had a chance to review.  Please know I will be contacting you for discussion  Homegoing instructions for all patient  A healthy lifestyle helps chronic diseases  These are all the goals you should strive to improve your overall health   Blood pressure <130/85   BMI of <30 or waist circumference that is 1/2 of your height   Fasting blood sugar <107 (if you are diabetic, aim for an A1c <6.4%_   LDL cholesterol <130   Avoid smoking   Manage your stress   Get your preventive exams   Get your immunizations       Ways to Help Prevent Falls at Home    Quick Tips   ? Ask for help if you need it. Most people want to help!   ? Get up slowly after sitting or laying down   ? Wear a medical alert device or keep cell phone in your pocket   ? Use night lights, especially areas near a bathroom   ? Keep the items you use often within reach on a small stool or end table   ? Use an assistive device such as walker or cane, as directed by provider/physical therapy   ? Use a non-slip mat and grab bars in your bathroom. Look for home health sections for best options     Other Areas to Focus On   ? Exercise and nutrition: Regular exercise or taking a falls prevention class are great ways improve strength and balance. Don’t forget to stay hydrated and bring a snack!   ? Medicine side effects: Some medicines can make you sleepy or dizzy, which could cause a fall. Ask your healthcare provider about the side effects your medicines could cause. Be sure to let them know if you take any vitamins or supplements as well.   ? Tripping hazards: Remove items you could trip on, such  as loose mats, rugs, cords, and clutter. Wear closed toe shoes with rubber soles.   ? Health and wellness: Get regular checkups with your healthcare provider, plus routine vision and hearing screenings. Talk with your healthcare provider about:   o Your medicines and the possible side effects - bring them in a bag if that is easier!   o Problems with balance or feeling dizzy   o Ways to promote bone health, such as Vitamin D and calcium supplements   o Questions or concerns about falling     *Ask your healthcare team if you have questions     ©[facility], 2022

## 2024-11-13 NOTE — ASSESSMENT & PLAN NOTE
Pt to continue meds.  Indocin not helping and will change NSAID.   Injections done for relief  Orders:    oxyCODONE-acetaminophen (Percocet) 7.5-325 mg tablet; Take 1 tablet by mouth every 6 hours if needed for severe pain (7 - 10). Do not fill before November 15, 2024.    diclofenac sodium (Voltaren) 1 % gel; Apply 4.5 inches (4 g) topically 4 times a day as needed (pain).    diclofenac (Voltaren) 75 mg EC tablet; Take 1 tablet (75 mg) by mouth 2 times a day. Do not crush, chew, or split.

## 2024-11-13 NOTE — ASSESSMENT & PLAN NOTE
Review of CSA done and  to continue to adhere to policy  CSA updated in EMR:      Orders:    naloxone (Narcan) 4 mg/0.1 mL nasal spray; Administer 1 spray (4 mg) into affected nostril(s) if needed for opioid reversal. May repeat every 2-3 minutes if needed, alternating nostrils, until medical assistance becomes available.

## 2024-12-16 ENCOUNTER — TELEPHONE (OUTPATIENT)
Dept: RHEUMATOLOGY | Facility: CLINIC | Age: 64
End: 2024-12-16
Payer: COMMERCIAL

## 2024-12-16 DIAGNOSIS — M15.9 GENERALIZED OSTEOARTHRITIS: ICD-10-CM

## 2024-12-16 RX ORDER — OXYCODONE AND ACETAMINOPHEN 7.5; 325 MG/1; MG/1
1 TABLET ORAL EVERY 6 HOURS PRN
Qty: 120 TABLET | Refills: 0 | Status: SHIPPED | OUTPATIENT
Start: 2024-12-16 | End: 2025-12-16

## 2024-12-16 NOTE — TELEPHONE ENCOUNTER
Rx Refill Request Telephone Encounter    Name:  Gopal Herrera  :  [id number]  Medication Name:  Oxycodone-acetaminophen 7.5-325 mg    Specific Pharmacy location:    [address]  [address] [id number]  Phone: [phone] Fax: [phone]    Date of last appointment:  24  Date of next appointment:  25  Best number to reach patient:  [phone]

## 2025-01-13 ENCOUNTER — TELEPHONE (OUTPATIENT)
Dept: PRIMARY CARE | Facility: CLINIC | Age: 65
End: 2025-01-13
Payer: COMMERCIAL

## 2025-01-13 DIAGNOSIS — M15.9 GENERALIZED OSTEOARTHRITIS: ICD-10-CM

## 2025-01-13 RX ORDER — OXYCODONE AND ACETAMINOPHEN 7.5; 325 MG/1; MG/1
1 TABLET ORAL EVERY 6 HOURS PRN
Qty: 120 TABLET | Refills: 0 | Status: SHIPPED | OUTPATIENT
Start: 2025-01-15 | End: 2026-01-15

## 2025-01-13 NOTE — TELEPHONE ENCOUNTER
Pt needs a refill on oxyCODONE-acetaminophen (Percocet) 7.5-325 mg tablet         [facility] [place], [place] - [address]  [address] [facility] [address]  Phone: [phone] Fax: [phone]

## 2025-02-06 ENCOUNTER — APPOINTMENT (OUTPATIENT)
Dept: RHEUMATOLOGY | Facility: CLINIC | Age: 65
End: 2025-02-06
Payer: COMMERCIAL

## 2025-02-06 VITALS
HEART RATE: 77 BPM | SYSTOLIC BLOOD PRESSURE: 118 MMHG | WEIGHT: 146.3 LBS | OXYGEN SATURATION: 97 % | HEIGHT: 64 IN | BODY MASS INDEX: 24.98 KG/M2 | TEMPERATURE: 98.4 F | DIASTOLIC BLOOD PRESSURE: 67 MMHG

## 2025-02-06 DIAGNOSIS — M19.012 LOCALIZED OSTEOARTHRITIS OF BOTH SHOULDER REGIONS: ICD-10-CM

## 2025-02-06 DIAGNOSIS — Z79.899 MEDICATION MANAGEMENT: ICD-10-CM

## 2025-02-06 DIAGNOSIS — M19.011 LOCALIZED OSTEOARTHRITIS OF BOTH SHOULDER REGIONS: ICD-10-CM

## 2025-02-06 DIAGNOSIS — M70.61 GREATER TROCHANTERIC BURSITIS OF BOTH HIPS: ICD-10-CM

## 2025-02-06 DIAGNOSIS — M15.9 GENERALIZED OSTEOARTHRITIS: Primary | ICD-10-CM

## 2025-02-06 DIAGNOSIS — M70.62 GREATER TROCHANTERIC BURSITIS OF BOTH HIPS: ICD-10-CM

## 2025-02-06 RX ORDER — TRIAMCINOLONE ACETONIDE 40 MG/ML
40 INJECTION, SUSPENSION INTRA-ARTICULAR; INTRAMUSCULAR
Status: COMPLETED | OUTPATIENT
Start: 2025-02-06 | End: 2025-02-06

## 2025-02-06 RX ADMIN — TRIAMCINOLONE ACETONIDE 40 MG: 40 INJECTION, SUSPENSION INTRA-ARTICULAR; INTRAMUSCULAR at 11:45

## 2025-02-06 ASSESSMENT — PATIENT HEALTH QUESTIONNAIRE - PHQ9
SUM OF ALL RESPONSES TO PHQ9 QUESTIONS 1 & 2: 0
2. FEELING DOWN, DEPRESSED OR HOPELESS: NOT AT ALL
1. LITTLE INTEREST OR PLEASURE IN DOING THINGS: NOT AT ALL

## 2025-02-06 ASSESSMENT — ENCOUNTER SYMPTOMS
SHORTNESS OF BREATH: 0
COUGH: 0
FEVER: 0
JOINT SWELLING: 0
WEAKNESS: 0
ARTHRALGIAS: 1
MYALGIAS: 1
NUMBNESS: 0
BACK PAIN: 0
COLOR CHANGE: 0
FATIGUE: 0

## 2025-02-06 NOTE — ASSESSMENT & PLAN NOTE
Review of CSA done and  to continue to adhere to policy  CSA updated in EMR:      Orders:    Opiate/Opioid/Benzo Prescription Compliance

## 2025-02-06 NOTE — PATIENT INSTRUCTIONS
It was a pleasure to see you today  Please call if your symptoms worsen  Please review your summary for education and reminders.  Follow up at your next appointment.    If you had labs/xrays done today, you will be able to view on [employer].   We will contact you when the results are reviewed for further discussion.  Please note that you may receive your results before I have had a chance to review.  Please know I will be contacting you for discussion  Homegoing instructions for all patient  A healthy lifestyle helps chronic diseases  These are all the goals you should strive to improve your overall health   Blood pressure <130/85   BMI of <30 or waist circumference that is 1/2 of your height   Fasting blood sugar <107 (if you are diabetic, aim for an A1c <6.4%_   LDL cholesterol <130   Avoid smoking   Manage your stress   Get your preventive exams   Get your immunizations       Ways to Help Prevent Falls at Home    Quick Tips   ? Ask for help if you need it. Most people want to help!   ? Get up slowly after sitting or laying down   ? Wear a medical alert device or keep cell phone in your pocket   ? Use night lights, especially areas near a bathroom   ? Keep the items you use often within reach on a small stool or end table   ? Use an assistive device such as walker or cane, as directed by provider/physical therapy   ? Use a non-slip mat and grab bars in your bathroom. Look for home health sections for best options     Other Areas to Focus On   ? Exercise and nutrition: Regular exercise or taking a falls prevention class are great ways improve strength and balance. Don’t forget to stay hydrated and bring a snack!   ? Medicine side effects: Some medicines can make you sleepy or dizzy, which could cause a fall. Ask your healthcare provider about the side effects your medicines could cause. Be sure to let them know if you take any vitamins or supplements as well.   ? Tripping hazards: Remove items you could trip on, such  as loose mats, rugs, cords, and clutter. Wear closed toe shoes with rubber soles.   ? Health and wellness: Get regular checkups with your healthcare provider, plus routine vision and hearing screenings. Talk with your healthcare provider about:   o Your medicines and the possible side effects - bring them in a bag if that is easier!   o Problems with balance or feeling dizzy   o Ways to promote bone health, such as Vitamin D and calcium supplements   o Questions or concerns about falling     *Ask your healthcare team if you have questions     ©[facility], 2022

## 2025-02-06 NOTE — PROGRESS NOTES
[facility] RHEUMATOLOGY     AND INTERNAL MEDICINE    RHEUMATOLOGY PROGRESS NOTE    Gopal Herrera 64 y.o. male  :  1960  PCP:  Santo Lott MD    Chief Complaint   Patient presents with    Osteoarthritis     Pain in Left shoulder and bilateral hip pain       SUBJECTIVE  Is doing well.  Less stress at his job  Desires injections for relief of his symptoms    OARRS:  Sheba Mtz MD on 2025 10:58 AM  I have personally reviewed the OARRS report for Gopal Herrera. I have considered the risks of abuse, dependence, addiction and diversion and I believe that it is clinically appropriate for Gopal Herrera to be prescribed this medication    Is the patient prescribed a combination of a benzodiazepine and opioid?  No    Last Urine Drug Screen / ordered today: Yes  Recent Results (from the past 8760 hours)   Confirmation Opiate/Opioid/Benzo Prescription Compliance    Collection Time: 24  8:55 AM   Result Value Ref Range    Clonazepam <25 <25 ng/mL    7-Aminoclonazepam <25 <25 ng/mL    Alprazolam <25 <25 ng/mL    Alpha-Hydroxyalprazolam <25 <25 ng/mL    Midazolam <25 <25 ng/mL    Alpha-Hydroxymidazolam <25 <25 ng/mL    Chlordiazepoxide <25 <25 ng/mL    Diazepam <25 <25 ng/mL    Nordiazepam <25 <25 ng/mL    Temazepam <25 <25 ng/mL    Oxazepam <25 <25 ng/mL    Lorazepam <25 <25 ng/mL    Methadone <25 <25 ng/mL    EDDP <25 <25 ng/mL    6-Acetylmorphine <25 <25 ng/mL    Codeine <50 <50 ng/mL    Hydrocodone <25 <25 ng/mL    Hydromorphone <25 <25 ng/mL    Morphine  <50 <50 ng/mL    Norhydrocodone <25 <25 ng/mL    Noroxycodone >1,000 (H) <25 ng/mL    Oxycodone 757 (H) <25 ng/mL    Oxymorphone 666 (H) <25 ng/mL    Fentanyl <2.5 <2.5 ng/mL    Norfentanyl <2.5 <2.5 ng/mL    Tramadol <50 <50 ng/mL    O-Desmethyltramadol <50 <50 ng/mL    Zolpidem <25 <25 ng/mL    Zolpidem Metabolite (ZCA) <25 <25 ng/mL   Screen Opiate/Opioid/Benzo Prescription Compliance     Collection Time: 05/16/24  8:55 AM   Result Value Ref Range    Creatinine, Urine Random 158.4 20.0 - 370.0 mg/dL    Amphetamine Screen, Urine Presumptive Negative Presumptive Negative    Barbiturate Screen, Urine Presumptive Negative Presumptive Negative    Cannabinoid Screen, Urine Presumptive Negative Presumptive Negative    Cocaine Metabolite Screen, Urine Presumptive Negative Presumptive Negative    PCP Screen, Urine Presumptive Negative Presumptive Negative     Results are as expected.         Controlled Substance Agreement:  Date of the Last Agreement: November Reviewed Controlled Substance Agreement including but not limited to the benefits, risks, and alternatives to treatment with a Controlled Substance medication(s).    Opioids:  What is the patient's goal of therapy? Pain relief  Is this being achieved with current treatment? yes    I have calculated the patient's Morphine Dose Equivalent (MED):   I have considered referral to Pain Management and/or a specialist, and do not feel it is necessary at this time.    I feel that it is clinically indicated to continue this current medication regimen after consideration of alternative therapies, and other non-opioid treatment.    Opioid Risk Screening:  No change in risk    Pain Assessment:  Controlled substance questionnaire    On scale of 0-10  -pain on average over the last week? 8  -pain at its worst over the last week? 10  %pain relieved by meds? 80  Amount of pain relief with meds make a difference? yes  MD agrees with efficacy of med? yes    Better/same/worse  Physical functioning better  Family relationships better  Social relationships better  Mood better    Sleep pattern better  Overall functioning better  Side effects? no    Records since last seen reviewed in [facility], [facility] and [facility] Record  Patient Active Problem List    Diagnosis Date Noted    Primary osteoarthritis of left hand 11/13/2024    HTN (hypertension) 09/17/2024    Vitamin  D deficiency 09/17/2024    Medication management 11/13/2023    Bilateral sciatica 10/17/2023    Generalized osteoarthritis 10/17/2023    Greater trochanteric bursitis of both hips 10/17/2023    Localized osteoarthritis of both shoulder regions 10/17/2023    Mixed hyperlipidemia 10/17/2023     Past Medical History:   Diagnosis Date    Caffeine use     Displaced fracture of middle phalanx of right little finger, initial encounter for closed fracture 01/12/2024    Primary osteoarthritis of both knees 10/17/2023     Current Outpatient Medications on File Prior to Visit   Medication Sig Dispense Refill    amitriptyline (Elavil) 10 mg tablet Take 2 tablets (20 mg) by mouth once daily at bedtime.      atorvastatin (Lipitor) 20 mg tablet Take 1 tablet (20 mg) by mouth once daily.      cyclobenzaprine (Flexeril) 10 mg tablet Take 1 tablet (10 mg) by mouth 3 times a day.      diclofenac (Voltaren) 75 mg EC tablet Take 1 tablet (75 mg) by mouth 2 times a day. Do not crush, chew, or split. 180 tablet 3    diclofenac sodium (Voltaren) 1 % gel Apply 4.5 inches (4 g) topically 4 times a day as needed (pain). 450 g 3    escitalopram (Lexapro) 10 mg tablet Take 1 tablet (10 mg) by mouth once daily.      lisinopril 20 mg tablet Take 1 tablet (20 mg) by mouth once daily.      naloxone (Narcan) 4 mg/0.1 mL nasal spray Administer 1 spray (4 mg) into affected nostril(s) if needed for opioid reversal. May repeat every 2-3 minutes if needed, alternating nostrils, until medical assistance becomes available. 2 each 0    oxyCODONE-acetaminophen (Percocet) 7.5-325 mg tablet Take 1 tablet by mouth every 6 hours if needed for severe pain (7 - 10). Do not fill before January 15, 2025. 120 tablet 0     No current facility-administered medications on file prior to visit.     Allergies   Allergen Reactions    Bacitracin Zinc-Polymyxin B Other    Benzalkonium Chloride Unknown    Neomycin-Bacitracin-Polymyxin Rash     Social History     Tobacco Use     "Smoking status: Former     Current packs/day: 0.00     Types: Cigarettes     Quit date: 5/16/2009     Years since quitting: 15.7    Smokeless tobacco: Never   Vaping Use    Vaping status: Never Used   Substance Use Topics    Alcohol use: Not Currently    Drug use: Yes     Types: Oxycodone     Comment: Prescribed     Review of Systems   Constitutional:  Negative for fatigue and fever.   Respiratory:  Negative for cough and shortness of breath.    Cardiovascular:  Negative for leg swelling.   Musculoskeletal:  Positive for arthralgias and myalgias. Negative for back pain, gait problem and joint swelling.   Skin:  Negative for color change and rash.   Neurological:  Negative for weakness and numbness.       PHYSICAL EXAM  /67   Pulse 77   Temp 36.9 °C (98.4 °F) (Temporal)   Ht 1.626 m (5' 4\")   Wt 66.4 kg (146 lb 4.8 oz)   SpO2 97%   BMI 25.11 kg/m²   Depression: Not at risk (2/6/2025)    PHQ-2     PHQ-2 Score: 0     Physical Exam  Vitals reviewed.   Constitutional:       General: He is not in acute distress.     Appearance: Normal appearance.   HENT:      Head: Normocephalic and atraumatic.   Eyes:      Conjunctiva/sclera: Conjunctivae normal.   Pulmonary:      Effort: Pulmonary effort is normal. No respiratory distress.   Musculoskeletal:         General: Tenderness present. No swelling or deformity. Normal range of motion.      Cervical back: Normal range of motion.      Right lower leg: No edema.      Left lower leg: No edema.      Comments: OA changes  S/p TKR B and R Shoulder  Pain with movement   Skin:     Findings: No bruising or rash.   Neurological:      General: No focal deficit present.      Mental Status: He is alert.      Gait: Gait normal.   Psychiatric:         Mood and Affect: Mood normal.     Patient ID: Gopal Herrera is a 64 y.o. male.    Large Joint Injection/Arthrocentesis: bilateral greater trochanteric bursa on 2/6/2025 11:45 AM  Indications: pain  Details: 25 G needle, lateral " approach  Medications (Right): 40 mg triamcinolone acetonide 40 mg/mL  Medications (Left): 40 mg triamcinolone acetonide 40 mg/mL  Outcome: tolerated well, no immediate complications  Procedure, treatment alternatives, risks and benefits explained, specific risks discussed. Consent was given by the patient. Immediately prior to procedure a time out was called to verify the correct patient, procedure, equipment, support staff and site/side marked as required. Patient was prepped and draped in the usual sterile fashion.       Large Joint Injection/Arthrocentesis: L glenohumeral on 2/6/2025 11:45 AM  Indications: pain  Details: 25 G needle, anterior approach  Medications: 40 mg triamcinolone acetonide 40 mg/mL  Outcome: tolerated well, no immediate complications  Procedure, treatment alternatives, risks and benefits explained, specific risks discussed. Consent was given by the patient. Immediately prior to procedure a time out was called to verify the correct patient, procedure, equipment, support staff and site/side marked as required. Patient was prepped and draped in the usual sterile fashion.             Health Maintenance Due   Topic Date Due    Yearly Adult Physical  Never done    HIV Screening  Never done    Colorectal Cancer Screening  Never done    Hepatitis C Screening  Never done    Diabetes Screening  Never done    Pneumococcal Vaccine (1 of 1 - PCV) Never done    Zoster Vaccines (1 of 2) Never done    Influenza Vaccine (1) 09/01/2024    COVID-19 Vaccine (3 - 2024-25 season) 09/01/2024       Assessment/plan  Assessment & Plan  Generalized osteoarthritis  Injections done for relief of symptoms today.  Pt to continue on NSAID and prn opioids.   Continue to monitor       Medication management  Review of CSA done and  to continue to adhere to policy  CSA updated in EMR:      Orders:    Opiate/Opioid/Benzo Prescription Compliance    Greater trochanteric bursitis of both hips  Injections done       Localized  osteoarthritis of both shoulder regions  Injection done on Left         Follow up: ____3_months, sooner if change in symptoms    Immunization History   Administered Date(s) Administered    Flu vaccine (IIV4), preservative free *Check age/dose* 11/11/2014, 11/17/2022    Flu vaccine, trivalent, preservative free, HIGH-DOSE, age 65y+ (Fluzone) 11/02/2020    Flu vaccine, trivalent, preservative free, age 6 months and greater (Fluarix/Fluzone/Flulaval) 09/10/2015    Influenza, seasonal, injectable 11/11/2014, 11/11/2021    Pfizer Purple Cap SARS-CoV-2 04/03/2021, 04/24/2021    Tdap vaccine, age 7 year and older (BOOSTRIX, ADACEL) 11/11/2014, 04/05/2021           Patient was identified as a fall risk. Risk prevention instructions provided.

## 2025-02-06 NOTE — LETTER
2025     Santo Lott MD  [address]  [facility]  [place] [address]    Patient: Gopal Herrera   YOB: 1960   Date of Visit: 2025       Dear Dr. Santo Lott MD:    Thank you for referring Gopal Herrera to me for evaluation. Below are my notes for this visit.  If you have questions, please do not hesitate to call me. I look forward to following your patient along with you.       Sincerely,     Sheba Mtz MD      CC: No Recipients  ______________________________________________________________________________________                                                    [facility] RHEUMATOLOGY     AND INTERNAL MEDICINE    RHEUMATOLOGY PROGRESS NOTE    Gopal Herrera 64 y.o. male  :  1960  PCP:  Santo Lott MD    Chief Complaint   Patient presents with   • Osteoarthritis     Pain in Left shoulder and bilateral hip pain       SUBJECTIVE  Is doing well.  Less stress at his job  Desires injections for relief of his symptoms    OARRS:  Sheba Mtz MD on 2025 10:58 AM  I have personally reviewed the OARRS report for Gopal Herrera. I have considered the risks of abuse, dependence, addiction and diversion and I believe that it is clinically appropriate for Gopal Herrera to be prescribed this medication    Is the patient prescribed a combination of a benzodiazepine and opioid?  No    Last Urine Drug Screen / ordered today: Yes  Recent Results (from the past 8760 hours)   Confirmation Opiate/Opioid/Benzo Prescription Compliance    Collection Time: 24  8:55 AM   Result Value Ref Range    Clonazepam <25 <25 ng/mL    7-Aminoclonazepam <25 <25 ng/mL    Alprazolam <25 <25 ng/mL    Alpha-Hydroxyalprazolam <25 <25 ng/mL    Midazolam <25 <25 ng/mL    Alpha-Hydroxymidazolam <25 <25 ng/mL    Chlordiazepoxide <25 <25 ng/mL    Diazepam <25 <25 ng/mL    Nordiazepam <25 <25 ng/mL    Temazepam <25 <25 ng/mL    Oxazepam  <25 <25 ng/mL    Lorazepam <25 <25 ng/mL    Methadone <25 <25 ng/mL    EDDP <25 <25 ng/mL    6-Acetylmorphine <25 <25 ng/mL    Codeine <50 <50 ng/mL    Hydrocodone <25 <25 ng/mL    Hydromorphone <25 <25 ng/mL    Morphine  <50 <50 ng/mL    Norhydrocodone <25 <25 ng/mL    Noroxycodone >1,000 (H) <25 ng/mL    Oxycodone 757 (H) <25 ng/mL    Oxymorphone 666 (H) <25 ng/mL    Fentanyl <2.5 <2.5 ng/mL    Norfentanyl <2.5 <2.5 ng/mL    Tramadol <50 <50 ng/mL    O-Desmethyltramadol <50 <50 ng/mL    Zolpidem <25 <25 ng/mL    Zolpidem Metabolite (ZCA) <25 <25 ng/mL   Screen Opiate/Opioid/Benzo Prescription Compliance    Collection Time: 05/16/24  8:55 AM   Result Value Ref Range    Creatinine, Urine Random 158.4 20.0 - 370.0 mg/dL    Amphetamine Screen, Urine Presumptive Negative Presumptive Negative    Barbiturate Screen, Urine Presumptive Negative Presumptive Negative    Cannabinoid Screen, Urine Presumptive Negative Presumptive Negative    Cocaine Metabolite Screen, Urine Presumptive Negative Presumptive Negative    PCP Screen, Urine Presumptive Negative Presumptive Negative     Results are as expected.         Controlled Substance Agreement:  Date of the Last Agreement: November Reviewed Controlled Substance Agreement including but not limited to the benefits, risks, and alternatives to treatment with a Controlled Substance medication(s).    Opioids:  What is the patient's goal of therapy? Pain relief  Is this being achieved with current treatment? yes    I have calculated the patient's Morphine Dose Equivalent (MED):   I have considered referral to Pain Management and/or a specialist, and do not feel it is necessary at this time.    I feel that it is clinically indicated to continue this current medication regimen after consideration of alternative therapies, and other non-opioid treatment.    Opioid Risk Screening:  No change in risk    Pain Assessment:  Controlled substance questionnaire    On scale of 0-10  -pain on  average over the last week? 8  -pain at its worst over the last week? 10  %pain relieved by meds? 80  Amount of pain relief with meds make a difference? yes  MD agrees with efficacy of med? yes    Better/same/worse  Physical functioning better  Family relationships better  Social relationships better  Mood better    Sleep pattern better  Overall functioning better  Side effects? no    Records since last seen reviewed in [facility], [facility] and [facility] Record  Patient Active Problem List    Diagnosis Date Noted   • Primary osteoarthritis of left hand 11/13/2024   • HTN (hypertension) 09/17/2024   • Vitamin D deficiency 09/17/2024   • Medication management 11/13/2023   • Bilateral sciatica 10/17/2023   • Generalized osteoarthritis 10/17/2023   • Greater trochanteric bursitis of both hips 10/17/2023   • Localized osteoarthritis of both shoulder regions 10/17/2023   • Mixed hyperlipidemia 10/17/2023     Past Medical History:   Diagnosis Date   • Caffeine use    • Displaced fracture of middle phalanx of right little finger, initial encounter for closed fracture 01/12/2024   • Primary osteoarthritis of both knees 10/17/2023     Current Outpatient Medications on File Prior to Visit   Medication Sig Dispense Refill   • amitriptyline (Elavil) 10 mg tablet Take 2 tablets (20 mg) by mouth once daily at bedtime.     • atorvastatin (Lipitor) 20 mg tablet Take 1 tablet (20 mg) by mouth once daily.     • cyclobenzaprine (Flexeril) 10 mg tablet Take 1 tablet (10 mg) by mouth 3 times a day.     • diclofenac (Voltaren) 75 mg EC tablet Take 1 tablet (75 mg) by mouth 2 times a day. Do not crush, chew, or split. 180 tablet 3   • diclofenac sodium (Voltaren) 1 % gel Apply 4.5 inches (4 g) topically 4 times a day as needed (pain). 450 g 3   • escitalopram (Lexapro) 10 mg tablet Take 1 tablet (10 mg) by mouth once daily.     • lisinopril 20 mg tablet Take 1 tablet (20 mg) by mouth once daily.     • naloxone (Narcan) 4 mg/0.1 mL  "nasal spray Administer 1 spray (4 mg) into affected nostril(s) if needed for opioid reversal. May repeat every 2-3 minutes if needed, alternating nostrils, until medical assistance becomes available. 2 each 0   • oxyCODONE-acetaminophen (Percocet) 7.5-325 mg tablet Take 1 tablet by mouth every 6 hours if needed for severe pain (7 - 10). Do not fill before January 15, 2025. 120 tablet 0     No current facility-administered medications on file prior to visit.     Allergies   Allergen Reactions   • Bacitracin Zinc-Polymyxin B Other   • Benzalkonium Chloride Unknown   • Neomycin-Bacitracin-Polymyxin Rash     Social History     Tobacco Use   • Smoking status: Former     Current packs/day: 0.00     Types: Cigarettes     Quit date: 5/16/2009     Years since quitting: 15.7   • Smokeless tobacco: Never   Vaping Use   • Vaping status: Never Used   Substance Use Topics   • Alcohol use: Not Currently   • Drug use: Yes     Types: Oxycodone     Comment: Prescribed     Review of Systems   Constitutional:  Negative for fatigue and fever.   Respiratory:  Negative for cough and shortness of breath.    Cardiovascular:  Negative for leg swelling.   Musculoskeletal:  Positive for arthralgias and myalgias. Negative for back pain, gait problem and joint swelling.   Skin:  Negative for color change and rash.   Neurological:  Negative for weakness and numbness.       PHYSICAL EXAM  /67   Pulse 77   Temp 36.9 °C (98.4 °F) (Temporal)   Ht 1.626 m (5' 4\")   Wt 66.4 kg (146 lb 4.8 oz)   SpO2 97%   BMI 25.11 kg/m²   Depression: Not at risk (2/6/2025)    PHQ-2    • PHQ-2 Score: 0     Physical Exam  Vitals reviewed.   Constitutional:       General: He is not in acute distress.     Appearance: Normal appearance.   HENT:      Head: Normocephalic and atraumatic.   Eyes:      Conjunctiva/sclera: Conjunctivae normal.   Pulmonary:      Effort: Pulmonary effort is normal. No respiratory distress.   Musculoskeletal:         General: Tenderness " present. No swelling or deformity. Normal range of motion.      Cervical back: Normal range of motion.      Right lower leg: No edema.      Left lower leg: No edema.      Comments: OA changes  S/p TKR B and R Shoulder  Pain with movement   Skin:     Findings: No bruising or rash.   Neurological:      General: No focal deficit present.      Mental Status: He is alert.      Gait: Gait normal.   Psychiatric:         Mood and Affect: Mood normal.     Patient ID: Gopal Herrera is a 64 y.o. male.    Large Joint Injection/Arthrocentesis: bilateral greater trochanteric bursa on 2/6/2025 11:45 AM  Indications: pain  Details: 25 G needle, lateral approach  Medications (Right): 40 mg triamcinolone acetonide 40 mg/mL  Medications (Left): 40 mg triamcinolone acetonide 40 mg/mL  Outcome: tolerated well, no immediate complications  Procedure, treatment alternatives, risks and benefits explained, specific risks discussed. Consent was given by the patient. Immediately prior to procedure a time out was called to verify the correct patient, procedure, equipment, support staff and site/side marked as required. Patient was prepped and draped in the usual sterile fashion.       Large Joint Injection/Arthrocentesis: L glenohumeral on 2/6/2025 11:45 AM  Indications: pain  Details: 25 G needle, anterior approach  Medications: 40 mg triamcinolone acetonide 40 mg/mL  Outcome: tolerated well, no immediate complications  Procedure, treatment alternatives, risks and benefits explained, specific risks discussed. Consent was given by the patient. Immediately prior to procedure a time out was called to verify the correct patient, procedure, equipment, support staff and site/side marked as required. Patient was prepped and draped in the usual sterile fashion.             Health Maintenance Due   Topic Date Due   • Yearly Adult Physical  Never done   • HIV Screening  Never done   • Colorectal Cancer Screening  Never done   • Hepatitis C Screening   Never done   • Diabetes Screening  Never done   • Pneumococcal Vaccine (1 of 1 - PCV) Never done   • Zoster Vaccines (1 of 2) Never done   • Influenza Vaccine (1) 09/01/2024   • COVID-19 Vaccine (3 - 2024-25 season) 09/01/2024       Assessment/plan  Assessment & Plan  Generalized osteoarthritis  Injections done for relief of symptoms today.  Pt to continue on NSAID and prn opioids.   Continue to monitor       Medication management  Review of CSA done and  to continue to adhere to policy  CSA updated in EMR:      Orders:  •  Opiate/Opioid/Benzo Prescription Compliance    Greater trochanteric bursitis of both hips  Injections done       Localized osteoarthritis of both shoulder regions  Injection done on Left         Follow up: ____3_months, sooner if change in symptoms    Immunization History   Administered Date(s) Administered   • Flu vaccine (IIV4), preservative free *Check age/dose* 11/11/2014, 11/17/2022   • Flu vaccine, trivalent, preservative free, HIGH-DOSE, age 65y+ (Fluzone) 11/02/2020   • Flu vaccine, trivalent, preservative free, age 6 months and greater (Fluarix/Fluzone/Flulaval) 09/10/2015   • Influenza, seasonal, injectable 11/11/2014, 11/11/2021   • Pfizer Purple Cap SARS-CoV-2 04/03/2021, 04/24/2021   • Tdap vaccine, age 7 year and older (BOOSTRIX, ADACEL) 11/11/2014, 04/05/2021           Patient was identified as a fall risk. Risk prevention instructions provided.

## 2025-02-06 NOTE — ASSESSMENT & PLAN NOTE
Injections done for relief of symptoms today.  Pt to continue on NSAID and prn opioids.   Continue to monitor

## 2025-02-09 LAB
1OH-MIDAZOLAM UR-MCNC: NEGATIVE NG/ML
7AMINOCLONAZEPAM UR-MCNC: NEGATIVE NG/ML
A-OH ALPRAZ UR-MCNC: NEGATIVE NG/ML
A-OH-TRIAZOLAM UR-MCNC: NEGATIVE NG/ML
AMPHETAMINES UR QL: NEGATIVE NG/ML
BARBITURATES UR QL: NEGATIVE NG/ML
BZE UR QL: NEGATIVE NG/ML
CODEINE UR-MCNC: NORMAL NG/ML
CREAT UR-MCNC: 162.1 MG/DL
DRUG SCREEN COMMENT UR-IMP: NORMAL
EDDP UR-MCNC: NORMAL NG/ML
FENTANYL UR-MCNC: NORMAL NG/ML
HYDROCODONE UR-MCNC: NORMAL UG/ML
HYDROMORPHONE UR-MCNC: NORMAL NG/ML
LORAZEPAM UR-MCNC: NEGATIVE NG/ML
METHADONE UR-MCNC: NORMAL UG/L
MORPHINE UR-MCNC: NORMAL NG/ML
NORDIAZEPAM UR-MCNC: NEGATIVE NG/ML
NORFENTANYL UR-MCNC: NORMAL NG/ML
NORHYDROCODONE UR CFM-MCNC: NORMAL NG/ML
NOROXYCODONE UR CFM-MCNC: NORMAL NG/ML
NORTRAMADOL UR-MCNC: NEGATIVE NG/ML
OH-ETHYLFLURAZ UR-MCNC: NEGATIVE NG/ML
OXAZEPAM UR-MCNC: NEGATIVE NG/ML
OXIDANTS UR QL: NEGATIVE MCG/ML
OXYCODONE UR CFM-MCNC: NORMAL NG/ML
OXYMORPHONE UR CFM-MCNC: NORMAL NG/ML
PCP UR QL: NEGATIVE NG/ML
PH UR: 5.3 [PH] (ref 4.5–9)
QUEST 6 ACETYLMORPHINE: NEGATIVE NG/ML
QUEST NOTES AND COMMENTS: NORMAL
QUEST PATIENT HISTORICAL REPORT: NORMAL
QUEST ZOLPIDEM: NEGATIVE NG/ML
TEMAZEPAM UR-MCNC: NEGATIVE NG/ML
THC UR QL: NEGATIVE NG/ML
TRAMADOL UR-MCNC: NEGATIVE NG/ML
ZOLPIDEM PHENYL-4-CARB UR CFM-MCNC: NEGATIVE NG/ML

## 2025-02-10 LAB
1OH-MIDAZOLAM UR-MCNC: NEGATIVE NG/ML
7AMINOCLONAZEPAM UR-MCNC: NEGATIVE NG/ML
A-OH ALPRAZ UR-MCNC: NEGATIVE NG/ML
A-OH-TRIAZOLAM UR-MCNC: NEGATIVE NG/ML
AMPHETAMINES UR QL: NEGATIVE NG/ML
BARBITURATES UR QL: NEGATIVE NG/ML
BZE UR QL: NEGATIVE NG/ML
CODEINE UR-MCNC: NEGATIVE NG/ML
CREAT UR-MCNC: 162.1 MG/DL
DRUG SCREEN COMMENT UR-IMP: NORMAL
EDDP UR-MCNC: NEGATIVE NG/ML
FENTANYL UR-MCNC: NEGATIVE NG/ML
HYDROCODONE UR-MCNC: NEGATIVE NG/ML
HYDROMORPHONE UR-MCNC: NEGATIVE NG/ML
LORAZEPAM UR-MCNC: NEGATIVE NG/ML
METHADONE UR-MCNC: NEGATIVE NG/ML
MORPHINE UR-MCNC: NEGATIVE NG/ML
NORDIAZEPAM UR-MCNC: NEGATIVE NG/ML
NORFENTANYL UR-MCNC: NEGATIVE NG/ML
NORHYDROCODONE UR CFM-MCNC: NEGATIVE NG/ML
NOROXYCODONE UR CFM-MCNC: NEGATIVE NG/ML
NORTRAMADOL UR-MCNC: NEGATIVE NG/ML
OH-ETHYLFLURAZ UR-MCNC: NEGATIVE NG/ML
OXAZEPAM UR-MCNC: NEGATIVE NG/ML
OXIDANTS UR QL: NEGATIVE MCG/ML
OXYCODONE UR CFM-MCNC: NEGATIVE NG/ML
OXYMORPHONE UR CFM-MCNC: NEGATIVE NG/ML
PCP UR QL: NEGATIVE NG/ML
PH UR: 5.3 [PH] (ref 4.5–9)
QUEST 6 ACETYLMORPHINE: NEGATIVE NG/ML
QUEST NOTES AND COMMENTS: NORMAL
QUEST ZOLPIDEM: NEGATIVE NG/ML
TEMAZEPAM UR-MCNC: NEGATIVE NG/ML
THC UR QL: NEGATIVE NG/ML
TRAMADOL UR-MCNC: NEGATIVE NG/ML
ZOLPIDEM PHENYL-4-CARB UR CFM-MCNC: NEGATIVE NG/ML

## 2025-02-13 ENCOUNTER — TELEPHONE (OUTPATIENT)
Dept: RHEUMATOLOGY | Facility: CLINIC | Age: 65
End: 2025-02-13
Payer: COMMERCIAL

## 2025-02-13 DIAGNOSIS — M15.9 GENERALIZED OSTEOARTHRITIS: ICD-10-CM

## 2025-02-13 RX ORDER — OXYCODONE AND ACETAMINOPHEN 7.5; 325 MG/1; MG/1
1 TABLET ORAL EVERY 6 HOURS PRN
Qty: 120 TABLET | Refills: 0 | Status: SHIPPED | OUTPATIENT
Start: 2025-02-14 | End: 2026-02-14

## 2025-02-13 NOTE — TELEPHONE ENCOUNTER
Rx Refill Request Telephone Encounter    Name:  Gopal Herrera  :  [id number]  Medication Name:  Oxycodone-acetaminophen 7.5-325 mg    Specific Pharmacy location:    [address]  [address] [address]  Phone: [phone] Fax: [phone]    Date of last appointment:  25  Date of next appointment:  25  Best number to reach patient wife: [phone]

## 2025-02-24 ENCOUNTER — TELEPHONE (OUTPATIENT)
Dept: RHEUMATOLOGY | Facility: CLINIC | Age: 65
End: 2025-02-24
Payer: COMMERCIAL

## 2025-02-24 NOTE — TELEPHONE ENCOUNTER
Patient was in  [facility] ED this past weekend.  His arthritis medication was changed.  This caused issues with his blood pressure and his potassium increased.  He was told in the ED that there was not much else he can take for his arthritis that would not be a problem with his blood pressure. I can request records if you need them.

## 2025-03-13 ENCOUNTER — TELEPHONE (OUTPATIENT)
Dept: RHEUMATOLOGY | Facility: CLINIC | Age: 65
End: 2025-03-13
Payer: COMMERCIAL

## 2025-03-13 DIAGNOSIS — M15.9 GENERALIZED OSTEOARTHRITIS: ICD-10-CM

## 2025-03-13 RX ORDER — OXYCODONE AND ACETAMINOPHEN 7.5; 325 MG/1; MG/1
1 TABLET ORAL EVERY 6 HOURS PRN
Qty: 120 TABLET | Refills: 0 | Status: SHIPPED | OUTPATIENT
Start: 2025-03-16 | End: 2026-03-16

## 2025-03-13 NOTE — TELEPHONE ENCOUNTER
Pts wife calling requesting    oxyCODONE-acetaminophen (Percocet) 7.5-325 mg tablet        [facility] - [place], [place] - [address]  [address] [facility] [address]  Phone: [phone] Fax: [phone]

## 2025-04-11 ENCOUNTER — TELEPHONE (OUTPATIENT)
Dept: PRIMARY CARE | Facility: CLINIC | Age: 65
End: 2025-04-11
Payer: COMMERCIAL

## 2025-04-11 DIAGNOSIS — M15.9 GENERALIZED OSTEOARTHRITIS: ICD-10-CM

## 2025-04-11 RX ORDER — OXYCODONE AND ACETAMINOPHEN 7.5; 325 MG/1; MG/1
1 TABLET ORAL EVERY 6 HOURS PRN
Qty: 120 TABLET | Refills: 0 | Status: SHIPPED | OUTPATIENT
Start: 2025-04-15 | End: 2026-04-15

## 2025-04-11 NOTE — TELEPHONE ENCOUNTER
Pt needs a refill on oxyCODONE-acetaminophen (Percocet) 7.5-325 mg tablet         [facility] - [place], [place] - [address] [facility] [address] [facility]  [address] [facility] [address]  Phone: [phone] Fax: [phone]

## 2025-05-12 ENCOUNTER — TELEPHONE (OUTPATIENT)
Dept: RHEUMATOLOGY | Facility: CLINIC | Age: 65
End: 2025-05-12
Payer: COMMERCIAL

## 2025-05-12 DIAGNOSIS — M15.9 GENERALIZED OSTEOARTHRITIS: ICD-10-CM

## 2025-05-12 RX ORDER — OXYCODONE AND ACETAMINOPHEN 7.5; 325 MG/1; MG/1
1 TABLET ORAL EVERY 6 HOURS PRN
Qty: 120 TABLET | Refills: 0 | Status: SHIPPED | OUTPATIENT
Start: 2025-05-15 | End: 2026-05-15

## 2025-05-12 NOTE — TELEPHONE ENCOUNTER
Patient called requesting rx refill-Percocet 7.5-325 mg    Ph-  [facility] - [place], [place] - [address] [facility]  [address] [id number]  Phone: [phone] Fax: [phone]      Susannah Phone  [phone]

## 2025-05-16 ENCOUNTER — APPOINTMENT (OUTPATIENT)
Dept: RHEUMATOLOGY | Facility: CLINIC | Age: 65
End: 2025-05-16
Payer: COMMERCIAL

## 2025-05-27 ENCOUNTER — APPOINTMENT (OUTPATIENT)
Dept: RHEUMATOLOGY | Facility: CLINIC | Age: 65
End: 2025-05-27
Payer: COMMERCIAL

## 2025-05-27 VITALS
DIASTOLIC BLOOD PRESSURE: 78 MMHG | SYSTOLIC BLOOD PRESSURE: 132 MMHG | TEMPERATURE: 98.6 F | HEIGHT: 64 IN | WEIGHT: 150.8 LBS | BODY MASS INDEX: 25.74 KG/M2 | OXYGEN SATURATION: 96 % | HEART RATE: 70 BPM

## 2025-05-27 DIAGNOSIS — M19.041 PRIMARY OSTEOARTHRITIS OF RIGHT HAND: ICD-10-CM

## 2025-05-27 DIAGNOSIS — N18.9 CHRONIC KIDNEY DISEASE, UNSPECIFIED CKD STAGE: ICD-10-CM

## 2025-05-27 DIAGNOSIS — M19.011 LOCALIZED OSTEOARTHRITIS OF BOTH SHOULDER REGIONS: ICD-10-CM

## 2025-05-27 DIAGNOSIS — M15.9 GENERALIZED OSTEOARTHRITIS: Primary | ICD-10-CM

## 2025-05-27 DIAGNOSIS — M19.042 PRIMARY OSTEOARTHRITIS OF LEFT HAND: ICD-10-CM

## 2025-05-27 DIAGNOSIS — Z79.899 MEDICATION MANAGEMENT: ICD-10-CM

## 2025-05-27 DIAGNOSIS — M19.012 LOCALIZED OSTEOARTHRITIS OF BOTH SHOULDER REGIONS: ICD-10-CM

## 2025-05-27 RX ORDER — TRIAMCINOLONE ACETONIDE 40 MG/ML
10 INJECTION, SUSPENSION INTRA-ARTICULAR; INTRAMUSCULAR
Status: COMPLETED | OUTPATIENT
Start: 2025-05-27 | End: 2025-05-27

## 2025-05-27 RX ORDER — TRIAMCINOLONE ACETONIDE 40 MG/ML
40 INJECTION, SUSPENSION INTRA-ARTICULAR; INTRAMUSCULAR
Status: COMPLETED | OUTPATIENT
Start: 2025-05-27 | End: 2025-05-27

## 2025-05-27 RX ORDER — AMLODIPINE BESYLATE 5 MG/1
TABLET ORAL
COMMUNITY
Start: 2025-04-17

## 2025-05-27 RX ADMIN — TRIAMCINOLONE ACETONIDE 10 MG: 40 INJECTION, SUSPENSION INTRA-ARTICULAR; INTRAMUSCULAR at 16:42

## 2025-05-27 RX ADMIN — TRIAMCINOLONE ACETONIDE 40 MG: 40 INJECTION, SUSPENSION INTRA-ARTICULAR; INTRAMUSCULAR at 16:42

## 2025-05-27 ASSESSMENT — PATIENT HEALTH QUESTIONNAIRE - PHQ9
2. FEELING DOWN, DEPRESSED OR HOPELESS: NOT AT ALL
1. LITTLE INTEREST OR PLEASURE IN DOING THINGS: NOT AT ALL
SUM OF ALL RESPONSES TO PHQ9 QUESTIONS 1 AND 2: 0

## 2025-05-27 ASSESSMENT — ENCOUNTER SYMPTOMS
ARTHRALGIAS: 1
MYALGIAS: 0
FEVER: 0
GASTROINTESTINAL NEGATIVE: 1
BACK PAIN: 0
JOINT SWELLING: 1
NUMBNESS: 0
FATIGUE: 0
EYES NEGATIVE: 1
COUGH: 0
WEAKNESS: 0
COLOR CHANGE: 0
ENDOCRINE NEGATIVE: 1
PSYCHIATRIC NEGATIVE: 1
SHORTNESS OF BREATH: 0

## 2025-05-27 NOTE — PROGRESS NOTES
[facility] RHEUMATOLOGY     AND INTERNAL MEDICINE    RHEUMATOLOGY PROGRESS NOTE    Gopal Herrera 64 y.o. male  :  1960      Chief Complaint   Patient presents with    Osteoarthritis       SUBJECTIVE  Recently found to have CKD.   BP being optimized and pt now off NSAID.  Within days, having more pain in hands.  Desires injections.   Also with L shoulder pain with decreased mobility and desires injection    OARRS:  Reviewed today  I have personally reviewed the OARRS report for Gopal Herrera. I have considered the risks of abuse, dependence, addiction and diversion and I believe that it is clinically appropriate for Gopal Herrera to be prescribed this medication    Is the patient prescribed a combination of a benzodiazepine and opioid?  No    Last Urine Drug Screen / ordered today: No  Recent Results (from the past 8760 hours)   Opiate/Opioid/Benzo Prescription Compliance    Collection Time: 25 11:35 AM   Result Value Ref Range    Creatinine 162.1 > or = 20.0 mg/dL    pH 5.3 4.5 - 9.0    Oxidant NEGATIVE <200 mcg/mL    Amphetamines NEGATIVE <500 ng/mL    Barbiturates NEGATIVE <300 ng/mL    Cocaine Metabolite NEGATIVE <150 ng/mL    Marijuana Metabolite NEGATIVE <20 ng/mL    Phencyclidine NEGATIVE <25 ng/mL    Alphahydroxyalprazolam NEGATIVE <25 ng/mL    Alphahydroxymidazolam NEGATIVE <50 ng/mL    Alphahydroxytriazolam NEGATIVE <50 ng/mL    Aminoclonazepam NEGATIVE <25 ng/mL    Hydroxyethylflurazepam NEGATIVE <50 ng/mL    Lorazepam NEGATIVE <50 ng/mL    Nordiazepam NEGATIVE <50 ng/mL    Oxazepam NEGATIVE <50 ng/mL    Temazepam NEGATIVE <50 ng/mL    Benzodiazepines Comments      Fentanyl NEGATIVE <0.5 ng/mL    Norfentanyl NEGATIVE <0.5 ng/mL    Fentanyl Comments      6 Acetylmorphine NEGATIVE <10 ng/mL    Heroin Metab Comments      EDDP NEGATIVE <100 ng/mL    Methadone NEGATIVE <100 ng/mL    Methadone Comments      Codeine NEGATIVE <50 ng/mL    Hydrocodone  NEGATIVE <50 ng/mL    Hydromorphone NEGATIVE <50 ng/mL    Morphine NEGATIVE <50 ng/mL    Norhydrocodone NEGATIVE <50 ng/mL    Opiates Comments      Noroxycodone NEGATIVE <50 ng/mL    Oxycodone NEGATIVE <50 ng/mL    Oxymorphone NEGATIVE <50 ng/mL    Oxycodone Comments      Desmethyltramadol NEGATIVE <100 ng/mL    Tramadol NEGATIVE <100 ng/mL    Tramadol Comments      Zolpidem NEGATIVE <5 ng/mL    Zolpidem Metabolite NEGATIVE <5 ng/mL    Zolpidem Comments      Notes and Comments       Results are as expected.         Controlled Substance Agreement:  Agreement: on file  Reviewed Controlled Substance Agreement including but not limited to the benefits, risks, and alternatives to treatment with a Controlled Substance medication(s).    Opioids:  What is the patient's goal of therapy? Pain relief  Is this being achieved with current treatment? yes    I have calculated the patient's Morphine Dose Equivalent (MED):   I have considered referral to Pain Management and/or a specialist, and do not feel it is necessary at this time.    I feel that it is clinically indicated to continue this current medication regimen after consideration of alternative therapies, and other non-opioid treatment.    Opioid Risk Screening:  No change in risk    Pain Assessment:  Controlled substance questionnaire    On scale of 0-10  -pain on average over the last week? 8  -pain at its worst over the last week? 10  %pain relieved by meds? 70  Amount of pain relief with meds make a difference? yes  MD agrees with efficacy of med? yes    Better/same/worse  Physical functioning better  Family relationships better  Social relationships better  Mood better    Sleep pattern better  Overall functioning better  Side effects? no    Records since last seen reviewed in [facility], [facility] and [facility] Record  Patient Active Problem List    Diagnosis Date Noted    Chronic kidney disease 05/27/2025    Primary osteoarthritis of left hand 11/13/2024    HTN  (hypertension) 09/17/2024    Vitamin D deficiency 09/17/2024    Medication management 11/13/2023    Bilateral sciatica 10/17/2023    Generalized osteoarthritis 10/17/2023    Greater trochanteric bursitis of both hips 10/17/2023    Localized osteoarthritis of both shoulder regions 10/17/2023    Mixed hyperlipidemia 10/17/2023     Past Medical History:   Diagnosis Date    Caffeine use     Displaced fracture of middle phalanx of right little finger, initial encounter for closed fracture 01/12/2024    Primary osteoarthritis of both knees 10/17/2023     Current Outpatient Medications on File Prior to Visit   Medication Sig Dispense Refill    amitriptyline (Elavil) 10 mg tablet Take 2 tablets (20 mg) by mouth once daily at bedtime.      amLODIPine (Norvasc) 5 mg tablet       atorvastatin (Lipitor) 20 mg tablet Take 1 tablet (20 mg) by mouth once daily.      cyclobenzaprine (Flexeril) 10 mg tablet Take 1 tablet (10 mg) by mouth 3 times a day.      diclofenac sodium (Voltaren) 1 % gel Apply 4.5 inches (4 g) topically 4 times a day as needed (pain). 450 g 3    escitalopram (Lexapro) 10 mg tablet Take 1 tablet (10 mg) by mouth once daily.      naloxone (Narcan) 4 mg/0.1 mL nasal spray Administer 1 spray (4 mg) into affected nostril(s) if needed for opioid reversal. May repeat every 2-3 minutes if needed, alternating nostrils, until medical assistance becomes available. 2 each 0    oxyCODONE-acetaminophen (Percocet) 7.5-325 mg tablet Take 1 tablet by mouth every 6 hours if needed for severe pain (7 - 10). Do not fill before May 15, 2025. 120 tablet 0    lisinopril 20 mg tablet Take 1 tablet (20 mg) by mouth once daily. (Patient not taking: Reported on 5/27/2025)      [DISCONTINUED] diclofenac (Voltaren) 75 mg EC tablet Take 1 tablet (75 mg) by mouth 2 times a day. Do not crush, chew, or split. 180 tablet 3     No current facility-administered medications on file prior to visit.     Allergies   Allergen Reactions    Bacitracin  "Zinc-Polymyxin B Other    Benzalkonium Chloride Unknown    Neomycin-Bacitracin-Polymyxin Rash     Social History     Tobacco Use    Smoking status: Former     Current packs/day: 0.00     Types: Cigarettes     Quit date: 2009     Years since quittin.0    Smokeless tobacco: Never   Vaping Use    Vaping status: Never Used   Substance Use Topics    Alcohol use: Not Currently    Drug use: Yes     Types: Oxycodone     Comment: Prescribed     Review of Systems   Constitutional:  Negative for fatigue and fever.   HENT: Negative.     Eyes: Negative.    Respiratory:  Negative for cough and shortness of breath.    Cardiovascular:  Negative for leg swelling.   Gastrointestinal: Negative.    Endocrine: Negative.    Genitourinary: Negative.    Musculoskeletal:  Positive for arthralgias and joint swelling. Negative for back pain, gait problem and myalgias.   Skin:  Negative for color change and rash.   Neurological:  Negative for weakness and numbness.   Psychiatric/Behavioral: Negative.         PHYSICAL EXAM  /78   Pulse 70   Temp 37 °C (98.6 °F)   Ht 1.626 m (5' 4\")   Wt 68.4 kg (150 lb 12.8 oz)   SpO2 96%   BMI 25.88 kg/m²   Depression: Not at risk (2025)    PHQ-2     PHQ-2 Score: 0     Physical Exam  Vitals reviewed.   Constitutional:       General: He is not in acute distress.     Appearance: Normal appearance.   HENT:      Head: Normocephalic and atraumatic.   Eyes:      Conjunctiva/sclera: Conjunctivae normal.   Pulmonary:      Effort: Pulmonary effort is normal. No respiratory distress.   Musculoskeletal:         General: Swelling and tenderness present. No deformity. Normal range of motion.      Cervical back: Normal range of motion.      Right lower leg: No edema.      Left lower leg: No edema.      Comments: OA changes  S/p TKR B and R Shoulder  Pain with movement  Swelling of MCPs bilaterally  Limited ROM of L shoulder   Skin:     Findings: No bruising or rash.   Neurological:      General: " No focal deficit present.      Mental Status: He is alert.      Gait: Gait normal.   Psychiatric:         Mood and Affect: Mood normal.       Patient ID: Gopal Herrera is a 64 y.o. male.    Large Joint Injection/Arthrocentesis: L glenohumeral on 5/27/2025 4:42 PM  Indications: pain  Details: 25 G needle, anterior approach  Medications: 40 mg triamcinolone acetonide 40 mg/mL  Outcome: tolerated well, no immediate complications  Procedure, treatment alternatives, risks and benefits explained, specific risks discussed. Consent was given by the patient. Immediately prior to procedure a time out was called to verify the correct patient, procedure, equipment, support staff and site/side marked as required. Patient was prepped and draped in the usual sterile fashion.       Small Joint Injection/Arthrocentesis: bilateral index MCP on 5/27/2025 4:42 PM  Indications: pain and joint swelling  Details: 25 G needle, medial approach  Medications (Right): 10 mg triamcinolone acetonide 40 mg/mL  Medications (Left): 10 mg triamcinolone acetonide 40 mg/mL  Outcome: tolerated well, no immediate complications  Procedure, treatment alternatives, risks and benefits explained, specific risks discussed. Consent was given by the patient. Immediately prior to procedure a time out was called to verify the correct patient, procedure, equipment, support staff and site/side marked as required. Patient was prepped and draped in the usual sterile fashion.           Health Maintenance Due   Topic Date Due    Yearly Adult Physical  Never done    HIV Screening  Never done    Colorectal Cancer Screening  Never done    Hepatitis C Screening  Never done    Diabetes Screening  Never done    Pneumococcal Vaccine (1 of 1 - PCV) Never done    Zoster Vaccines (1 of 2) Never done    COVID-19 Vaccine (3 - 2024-25 season) 09/01/2024       Assessment/plan  Assessment & Plan  Generalized osteoarthritis  Pt with multilevel arthritis.  Pt to continue on opioids prn.    Injections of joints as needed  Orders:    Follow Up In Rheumatology; Future    Medication management  Review of CSA done and  to continue to adhere to policy  CSA updated in EMR:      Orders:    Follow Up In Rheumatology; Future    Primary osteoarthritis of left hand  Localized osteoarthritis of both shoulder regions  Primary osteoarthritis of right hand  Injections done for relief of symptoms           Chronic kidney disease, unspecified CKD stage           Follow up: __3___months, sooner if change in symptoms    Immunization History   Administered Date(s) Administered    Flu vaccine (IIV4), preservative free *Check age/dose* 11/11/2014, 11/17/2022    Flu vaccine, trivalent, preservative free, HIGH-DOSE, age 65y+ (Fluzone) 11/02/2020    Flu vaccine, trivalent, preservative free, age 6 months and greater (Fluarix/Fluzone/Flulaval) 09/10/2015    Influenza, seasonal, injectable 11/11/2014, 11/11/2021    Pfizer Purple Cap SARS-CoV-2 04/03/2021, 04/24/2021    Tdap vaccine, age 7 year and older (BOOSTRIX, ADACEL) 11/11/2014, 04/05/2021           Patient was identified as a fall risk. Risk prevention instructions provided.

## 2025-05-27 NOTE — PATIENT INSTRUCTIONS
It was a pleasure to see you today  Please call if your symptoms worsen  Please review your summary for education and reminders.  Follow up at your next appointment.    If you had labs/xrays done today, you will be able to view on [employer].   We will contact you when the results are reviewed for further discussion.  Please note that you may receive your results before I have had a chance to review.  Please know I will be contacting you for discussion  Homegoing instructions for all patient  A healthy lifestyle helps chronic diseases  These are all the goals you should strive to improve your overall health   Blood pressure <130/85   BMI of <30 or waist circumference that is 1/2 of your height   Fasting blood sugar <107 (if you are diabetic, aim for an A1c <6.4%_   LDL cholesterol <130   Avoid smoking   Manage your stress   Get your preventive exams   Get your immunizations   Common Emergency Awareness Tips   IS IT A STROKE?   Act FAST and Check for these signs:   FACE Does the face look uneven?   ARM Does one arm drift down?   SPEECH Does their speech sound strange?   TIME Call 9-1-1 at any sign of stroke     Heart Attack Signs   Chest discomfort: Most heart attacks involve discomfort in the center of the chest and lasts more than a few minutes, or goes away and comes back. It can feel like uncomfortable pressure, squeezing, fullness or pain.   Discomfort in upper body: Symptoms can include pain or discomfort in one or both arms, back, neck, jaw or stomach.   Shortness of breath: With or without discomfort.   Other signs: Breaking out in a cold sweat, nausea, or lightheaded.   Remember, MINUTES DO MATTER. If you experience any of these heart attack warning signs, call 9-1-1 to get immediate medical attention!          Ways to Help Prevent Falls at Home    Quick Tips   ? Ask for help if you need it. Most people want to help!   ? Get up slowly after sitting or laying down   ? Wear a medical alert device or keep cell  phone in your pocket   ? Use night lights, especially areas near a bathroom   ? Keep the items you use often within reach on a small stool or end table   ? Use an assistive device such as walker or cane, as directed by provider/physical therapy   ? Use a non-slip mat and grab bars in your bathroom. Look for home health sections for best options     Other Areas to Focus On   ? Exercise and nutrition: Regular exercise or taking a falls prevention class are great ways improve strength and balance. Don’t forget to stay hydrated and bring a snack!   ? Medicine side effects: Some medicines can make you sleepy or dizzy, which could cause a fall. Ask your healthcare provider about the side effects your medicines could cause. Be sure to let them know if you take any vitamins or supplements as well.   ? Tripping hazards: Remove items you could trip on, such as loose mats, rugs, cords, and clutter. Wear closed toe shoes with rubber soles.   ? Health and wellness: Get regular checkups with your healthcare provider, plus routine vision and hearing screenings. Talk with your healthcare provider about:   o Your medicines and the possible side effects - bring them in a bag if that is easier!   o Problems with balance or feeling dizzy   o Ways to promote bone health, such as Vitamin D and calcium supplements   o Questions or concerns about falling     *Ask your healthcare team if you have questions     ©[facility], 2022

## 2025-05-27 NOTE — ASSESSMENT & PLAN NOTE
Pt with multilevel arthritis.  Pt to continue on opioids prn.   Injections of joints as needed  Orders:    Follow Up In Rheumatology; Future

## 2025-05-27 NOTE — LETTER
May 27, 2025     Santo Lott MD  [id number]  [facility]  [place] [address]    Patient: Gopal Herrera   YOB: 1960   Date of Visit: 2025       Dear Dr. Santo Lott MD:    Thank you for referring Gopal Herrera to me for evaluation. Below are my notes for this visit.  If you have questions, please do not hesitate to call me. I look forward to following your patient along with you.       Sincerely,     Sheba Mtz MD      CC: No Recipients  ______________________________________________________________________________________                                                    [facility] RHEUMATOLOGY     AND INTERNAL MEDICINE    RHEUMATOLOGY PROGRESS NOTE    Gopal Herrera 64 y.o. male  :  1960      Chief Complaint   Patient presents with   • Osteoarthritis       SUBJECTIVE  Recently found to have CKD.   BP being optimized and pt now off NSAID.  Within days, having more pain in hands.  Desires injections.   Also with L shoulder pain with decreased mobility and desires injection    OARRS:  Reviewed today  I have personally reviewed the OARRS report for Gopal Herrera. I have considered the risks of abuse, dependence, addiction and diversion and I believe that it is clinically appropriate for Gopal Herrera to be prescribed this medication    Is the patient prescribed a combination of a benzodiazepine and opioid?  No    Last Urine Drug Screen / ordered today: No  Recent Results (from the past 8760 hours)   Opiate/Opioid/Benzo Prescription Compliance    Collection Time: 25 11:35 AM   Result Value Ref Range    Creatinine 162.1 > or = 20.0 mg/dL    pH 5.3 4.5 - 9.0    Oxidant NEGATIVE <200 mcg/mL    Amphetamines NEGATIVE <500 ng/mL    Barbiturates NEGATIVE <300 ng/mL    Cocaine Metabolite NEGATIVE <150 ng/mL    Marijuana Metabolite NEGATIVE <20 ng/mL    Phencyclidine NEGATIVE <25 ng/mL    Alphahydroxyalprazolam NEGATIVE <25  ng/mL    Alphahydroxymidazolam NEGATIVE <50 ng/mL    Alphahydroxytriazolam NEGATIVE <50 ng/mL    Aminoclonazepam NEGATIVE <25 ng/mL    Hydroxyethylflurazepam NEGATIVE <50 ng/mL    Lorazepam NEGATIVE <50 ng/mL    Nordiazepam NEGATIVE <50 ng/mL    Oxazepam NEGATIVE <50 ng/mL    Temazepam NEGATIVE <50 ng/mL    Benzodiazepines Comments      Fentanyl NEGATIVE <0.5 ng/mL    Norfentanyl NEGATIVE <0.5 ng/mL    Fentanyl Comments      6 Acetylmorphine NEGATIVE <10 ng/mL    Heroin Metab Comments      EDDP NEGATIVE <100 ng/mL    Methadone NEGATIVE <100 ng/mL    Methadone Comments      Codeine NEGATIVE <50 ng/mL    Hydrocodone NEGATIVE <50 ng/mL    Hydromorphone NEGATIVE <50 ng/mL    Morphine NEGATIVE <50 ng/mL    Norhydrocodone NEGATIVE <50 ng/mL    Opiates Comments      Noroxycodone NEGATIVE <50 ng/mL    Oxycodone NEGATIVE <50 ng/mL    Oxymorphone NEGATIVE <50 ng/mL    Oxycodone Comments      Desmethyltramadol NEGATIVE <100 ng/mL    Tramadol NEGATIVE <100 ng/mL    Tramadol Comments      Zolpidem NEGATIVE <5 ng/mL    Zolpidem Metabolite NEGATIVE <5 ng/mL    Zolpidem Comments      Notes and Comments       Results are as expected.         Controlled Substance Agreement:  Agreement: on file  Reviewed Controlled Substance Agreement including but not limited to the benefits, risks, and alternatives to treatment with a Controlled Substance medication(s).    Opioids:  What is the patient's goal of therapy? Pain relief  Is this being achieved with current treatment? yes    I have calculated the patient's Morphine Dose Equivalent (MED):   I have considered referral to Pain Management and/or a specialist, and do not feel it is necessary at this time.    I feel that it is clinically indicated to continue this current medication regimen after consideration of alternative therapies, and other non-opioid treatment.    Opioid Risk Screening:  No change in risk    Pain Assessment:  Controlled substance questionnaire    On scale of 0-10  -pain  on average over the last week? 8  -pain at its worst over the last week? 10  %pain relieved by meds? 70  Amount of pain relief with meds make a difference? yes  MD agrees with efficacy of med? yes    Better/same/worse  Physical functioning better  Family relationships better  Social relationships better  Mood better    Sleep pattern better  Overall functioning better  Side effects? no    Records since last seen reviewed in [facility], [facility] and [facility] Record  Patient Active Problem List    Diagnosis Date Noted   • Chronic kidney disease 05/27/2025   • Primary osteoarthritis of left hand 11/13/2024   • HTN (hypertension) 09/17/2024   • Vitamin D deficiency 09/17/2024   • Medication management 11/13/2023   • Bilateral sciatica 10/17/2023   • Generalized osteoarthritis 10/17/2023   • Greater trochanteric bursitis of both hips 10/17/2023   • Localized osteoarthritis of both shoulder regions 10/17/2023   • Mixed hyperlipidemia 10/17/2023     Past Medical History:   Diagnosis Date   • Caffeine use    • Displaced fracture of middle phalanx of right little finger, initial encounter for closed fracture 01/12/2024   • Primary osteoarthritis of both knees 10/17/2023     Current Outpatient Medications on File Prior to Visit   Medication Sig Dispense Refill   • amitriptyline (Elavil) 10 mg tablet Take 2 tablets (20 mg) by mouth once daily at bedtime.     • amLODIPine (Norvasc) 5 mg tablet      • atorvastatin (Lipitor) 20 mg tablet Take 1 tablet (20 mg) by mouth once daily.     • cyclobenzaprine (Flexeril) 10 mg tablet Take 1 tablet (10 mg) by mouth 3 times a day.     • diclofenac sodium (Voltaren) 1 % gel Apply 4.5 inches (4 g) topically 4 times a day as needed (pain). 450 g 3   • escitalopram (Lexapro) 10 mg tablet Take 1 tablet (10 mg) by mouth once daily.     • naloxone (Narcan) 4 mg/0.1 mL nasal spray Administer 1 spray (4 mg) into affected nostril(s) if needed for opioid reversal. May repeat every 2-3 minutes if  "needed, alternating nostrils, until medical assistance becomes available. 2 each 0   • oxyCODONE-acetaminophen (Percocet) 7.5-325 mg tablet Take 1 tablet by mouth every 6 hours if needed for severe pain (7 - 10). Do not fill before May 15, 2025. 120 tablet 0   • lisinopril 20 mg tablet Take 1 tablet (20 mg) by mouth once daily. (Patient not taking: Reported on 2025)     • [DISCONTINUED] diclofenac (Voltaren) 75 mg EC tablet Take 1 tablet (75 mg) by mouth 2 times a day. Do not crush, chew, or split. 180 tablet 3     No current facility-administered medications on file prior to visit.     Allergies   Allergen Reactions   • Bacitracin Zinc-Polymyxin B Other   • Benzalkonium Chloride Unknown   • Neomycin-Bacitracin-Polymyxin Rash     Social History     Tobacco Use   • Smoking status: Former     Current packs/day: 0.00     Types: Cigarettes     Quit date: 2009     Years since quittin.0   • Smokeless tobacco: Never   Vaping Use   • Vaping status: Never Used   Substance Use Topics   • Alcohol use: Not Currently   • Drug use: Yes     Types: Oxycodone     Comment: Prescribed     Review of Systems   Constitutional:  Negative for fatigue and fever.   HENT: Negative.     Eyes: Negative.    Respiratory:  Negative for cough and shortness of breath.    Cardiovascular:  Negative for leg swelling.   Gastrointestinal: Negative.    Endocrine: Negative.    Genitourinary: Negative.    Musculoskeletal:  Positive for arthralgias and joint swelling. Negative for back pain, gait problem and myalgias.   Skin:  Negative for color change and rash.   Neurological:  Negative for weakness and numbness.   Psychiatric/Behavioral: Negative.         PHYSICAL EXAM  /78   Pulse 70   Temp 37 °C (98.6 °F)   Ht 1.626 m (5' 4\")   Wt 68.4 kg (150 lb 12.8 oz)   SpO2 96%   BMI 25.88 kg/m²   Depression: Not at risk (2025)    PHQ-2    • PHQ-2 Score: 0     Physical Exam  Vitals reviewed.   Constitutional:       General: He is not " in acute distress.     Appearance: Normal appearance.   HENT:      Head: Normocephalic and atraumatic.   Eyes:      Conjunctiva/sclera: Conjunctivae normal.   Pulmonary:      Effort: Pulmonary effort is normal. No respiratory distress.   Musculoskeletal:         General: Swelling and tenderness present. No deformity. Normal range of motion.      Cervical back: Normal range of motion.      Right lower leg: No edema.      Left lower leg: No edema.      Comments: OA changes  S/p TKR B and R Shoulder  Pain with movement  Swelling of MCPs bilaterally  Limited ROM of L shoulder   Skin:     Findings: No bruising or rash.   Neurological:      General: No focal deficit present.      Mental Status: He is alert.      Gait: Gait normal.   Psychiatric:         Mood and Affect: Mood normal.       Patient ID: Gopal Herrera is a 64 y.o. male.    Large Joint Injection/Arthrocentesis: L glenohumeral on 5/27/2025 4:42 PM  Indications: pain  Details: 25 G needle, anterior approach  Medications: 40 mg triamcinolone acetonide 40 mg/mL  Outcome: tolerated well, no immediate complications  Procedure, treatment alternatives, risks and benefits explained, specific risks discussed. Consent was given by the patient. Immediately prior to procedure a time out was called to verify the correct patient, procedure, equipment, support staff and site/side marked as required. Patient was prepped and draped in the usual sterile fashion.       Small Joint Injection/Arthrocentesis: bilateral index MCP on 5/27/2025 4:42 PM  Indications: pain and joint swelling  Details: 25 G needle, medial approach  Medications (Right): 10 mg triamcinolone acetonide 40 mg/mL  Medications (Left): 10 mg triamcinolone acetonide 40 mg/mL  Outcome: tolerated well, no immediate complications  Procedure, treatment alternatives, risks and benefits explained, specific risks discussed. Consent was given by the patient. Immediately prior to procedure a time out was called to verify  the correct patient, procedure, equipment, support staff and site/side marked as required. Patient was prepped and draped in the usual sterile fashion.           Health Maintenance Due   Topic Date Due   • Yearly Adult Physical  Never done   • HIV Screening  Never done   • Colorectal Cancer Screening  Never done   • Hepatitis C Screening  Never done   • Diabetes Screening  Never done   • Pneumococcal Vaccine (1 of 1 - PCV) Never done   • Zoster Vaccines (1 of 2) Never done   • COVID-19 Vaccine (3 - 2024-25 season) 09/01/2024       Assessment/plan  Assessment & Plan  Generalized osteoarthritis  Pt with multilevel arthritis.  Pt to continue on opioids prn.   Injections of joints as needed  Orders:  •  Follow Up In Rheumatology; Future    Medication management  Review of CSA done and  to continue to adhere to policy  CSA updated in EMR:      Orders:  •  Follow Up In Rheumatology; Future    Primary osteoarthritis of left hand  Localized osteoarthritis of both shoulder regions  Primary osteoarthritis of right hand  Injections done for relief of symptoms           Chronic kidney disease, unspecified CKD stage           Follow up: __3___months, sooner if change in symptoms    Immunization History   Administered Date(s) Administered   • Flu vaccine (IIV4), preservative free *Check age/dose* 11/11/2014, 11/17/2022   • Flu vaccine, trivalent, preservative free, HIGH-DOSE, age 65y+ (Fluzone) 11/02/2020   • Flu vaccine, trivalent, preservative free, age 6 months and greater (Fluarix/Fluzone/Flulaval) 09/10/2015   • Influenza, seasonal, injectable 11/11/2014, 11/11/2021   • Pfizer Purple Cap SARS-CoV-2 04/03/2021, 04/24/2021   • Tdap vaccine, age 7 year and older (BOOSTRIX, ADACEL) 11/11/2014, 04/05/2021           Patient was identified as a fall risk. Risk prevention instructions provided.

## 2025-06-12 ENCOUNTER — TELEPHONE (OUTPATIENT)
Dept: RHEUMATOLOGY | Facility: CLINIC | Age: 65
End: 2025-06-12
Payer: COMMERCIAL

## 2025-06-12 DIAGNOSIS — M15.9 GENERALIZED OSTEOARTHRITIS: ICD-10-CM

## 2025-06-12 RX ORDER — OXYCODONE AND ACETAMINOPHEN 7.5; 325 MG/1; MG/1
1 TABLET ORAL EVERY 6 HOURS PRN
Qty: 120 TABLET | Refills: 0 | Status: SHIPPED | OUTPATIENT
Start: 2025-06-14 | End: 2026-06-14

## 2025-06-12 NOTE — TELEPHONE ENCOUNTER
Rx Refill Request Telephone Encounter     Name:  Susannah Herrera  :  [id number]  Medication Name:  Oxycodone-acetaminophen 7.5-325 mg     Specific Pharmacy location:    [address]  [address] [place] [address]  Phone: [phone] Fax: [phone]     Date of last appointment:  25  Date of next appointment:  08/15/25  Best number to reach patient:  [phone]

## 2025-07-11 ENCOUNTER — TELEPHONE (OUTPATIENT)
Dept: PRIMARY CARE | Facility: CLINIC | Age: 65
End: 2025-07-11
Payer: COMMERCIAL

## 2025-07-11 DIAGNOSIS — M15.9 GENERALIZED OSTEOARTHRITIS: ICD-10-CM

## 2025-07-11 RX ORDER — PREDNISONE 10 MG/1
TABLET ORAL
Qty: 30 TABLET | Refills: 0 | Status: SHIPPED | OUTPATIENT
Start: 2025-07-11 | End: 2025-07-23

## 2025-07-11 RX ORDER — OXYCODONE AND ACETAMINOPHEN 7.5; 325 MG/1; MG/1
1 TABLET ORAL EVERY 6 HOURS PRN
Qty: 120 TABLET | Refills: 0 | Status: SHIPPED | OUTPATIENT
Start: 2025-07-14 | End: 2026-07-14

## 2025-07-11 NOTE — TELEPHONE ENCOUNTER
Rx Refill Request Telephone Encounter    Name:  Gopal Herrera  :  [id number]  Medication Name:  Oxycodone-acetaminophen 7.5-325 mg                          Prednisone 10 mg 4 x 3 days, 3 x 3 days down to one for arthritis coming on progressively again in multi joint      Specific Pharmacy location:    [facility] [place] [address]  Phone: [phone] Fax: [phone]    Date of last appointment:  25  Date of next appointment:  08/15/25  Best number to reach patient:  [phone]

## 2025-08-11 ENCOUNTER — TELEPHONE (OUTPATIENT)
Dept: RHEUMATOLOGY | Facility: CLINIC | Age: 65
End: 2025-08-11
Payer: COMMERCIAL

## 2025-08-11 DIAGNOSIS — M15.9 GENERALIZED OSTEOARTHRITIS: ICD-10-CM

## 2025-08-11 RX ORDER — OXYCODONE AND ACETAMINOPHEN 7.5; 325 MG/1; MG/1
1 TABLET ORAL EVERY 6 HOURS PRN
Qty: 120 TABLET | Refills: 0 | Status: SHIPPED | OUTPATIENT
Start: 2025-08-13 | End: 2026-08-13

## 2025-08-15 ENCOUNTER — APPOINTMENT (OUTPATIENT)
Dept: RHEUMATOLOGY | Facility: CLINIC | Age: 65
End: 2025-08-15
Payer: COMMERCIAL

## 2025-08-15 VITALS
WEIGHT: 152.4 LBS | OXYGEN SATURATION: 93 % | HEART RATE: 80 BPM | SYSTOLIC BLOOD PRESSURE: 121 MMHG | HEIGHT: 64 IN | TEMPERATURE: 97.1 F | DIASTOLIC BLOOD PRESSURE: 68 MMHG | BODY MASS INDEX: 26.02 KG/M2

## 2025-08-15 DIAGNOSIS — M19.012 LOCALIZED OSTEOARTHRITIS OF BOTH SHOULDER REGIONS: ICD-10-CM

## 2025-08-15 DIAGNOSIS — M19.041 PRIMARY OSTEOARTHRITIS OF RIGHT HAND: ICD-10-CM

## 2025-08-15 DIAGNOSIS — M15.9 GENERALIZED OSTEOARTHRITIS: Primary | ICD-10-CM

## 2025-08-15 DIAGNOSIS — M19.011 LOCALIZED OSTEOARTHRITIS OF BOTH SHOULDER REGIONS: ICD-10-CM

## 2025-08-15 DIAGNOSIS — M19.042 PRIMARY OSTEOARTHRITIS OF LEFT HAND: ICD-10-CM

## 2025-08-15 DIAGNOSIS — Z79.899 MEDICATION MANAGEMENT: ICD-10-CM

## 2025-08-15 PROBLEM — M70.61 GREATER TROCHANTERIC BURSITIS OF BOTH HIPS: Status: RESOLVED | Noted: 2023-10-17 | Resolved: 2025-08-15

## 2025-08-15 PROBLEM — M70.62 GREATER TROCHANTERIC BURSITIS OF BOTH HIPS: Status: RESOLVED | Noted: 2023-10-17 | Resolved: 2025-08-15

## 2025-08-15 RX ORDER — TRIAMCINOLONE ACETONIDE 40 MG/ML
10 INJECTION, SUSPENSION INTRA-ARTICULAR; INTRAMUSCULAR
Status: COMPLETED | OUTPATIENT
Start: 2025-08-15 | End: 2025-08-15

## 2025-08-15 RX ORDER — TRIAMCINOLONE ACETONIDE 40 MG/ML
40 INJECTION, SUSPENSION INTRA-ARTICULAR; INTRAMUSCULAR
Status: COMPLETED | OUTPATIENT
Start: 2025-08-15 | End: 2025-08-15

## 2025-08-15 RX ADMIN — TRIAMCINOLONE ACETONIDE 10 MG: 40 INJECTION, SUSPENSION INTRA-ARTICULAR; INTRAMUSCULAR at 11:24

## 2025-08-15 RX ADMIN — TRIAMCINOLONE ACETONIDE 40 MG: 40 INJECTION, SUSPENSION INTRA-ARTICULAR; INTRAMUSCULAR at 11:24

## 2025-08-15 ASSESSMENT — ENCOUNTER SYMPTOMS
NUMBNESS: 0
ENDOCRINE NEGATIVE: 1
WEAKNESS: 0
ARTHRALGIAS: 1
MYALGIAS: 0
EYES NEGATIVE: 1
COLOR CHANGE: 0
FEVER: 0
FATIGUE: 0
COUGH: 0
GASTROINTESTINAL NEGATIVE: 1
BACK PAIN: 0
PSYCHIATRIC NEGATIVE: 1
SHORTNESS OF BREATH: 0
JOINT SWELLING: 1

## 2025-08-15 ASSESSMENT — PATIENT HEALTH QUESTIONNAIRE - PHQ9
1. LITTLE INTEREST OR PLEASURE IN DOING THINGS: SEVERAL DAYS
SUM OF ALL RESPONSES TO PHQ9 QUESTIONS 1 AND 2: 2
2. FEELING DOWN, DEPRESSED OR HOPELESS: SEVERAL DAYS

## 2025-11-21 ENCOUNTER — APPOINTMENT (OUTPATIENT)
Dept: RHEUMATOLOGY | Facility: CLINIC | Age: 65
End: 2025-11-21
Payer: COMMERCIAL

## 2026-02-20 ENCOUNTER — APPOINTMENT (OUTPATIENT)
Dept: RHEUMATOLOGY | Facility: CLINIC | Age: 66
End: 2026-02-20
Payer: COMMERCIAL